# Patient Record
Sex: FEMALE | Race: WHITE | NOT HISPANIC OR LATINO | Employment: UNEMPLOYED | URBAN - METROPOLITAN AREA
[De-identification: names, ages, dates, MRNs, and addresses within clinical notes are randomized per-mention and may not be internally consistent; named-entity substitution may affect disease eponyms.]

---
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
## 2018-01-24 ENCOUNTER — OFFICE VISIT (OUTPATIENT)
Dept: INTERNAL MEDICINE | Facility: CLINIC | Age: 18
End: 2018-01-24
Payer: COMMERCIAL

## 2018-01-24 ENCOUNTER — HOSPITAL ENCOUNTER (EMERGENCY)
Facility: HOSPITAL | Age: 18
Discharge: HOME | End: 2018-01-24
Attending: EMERGENCY MEDICINE
Payer: COMMERCIAL

## 2018-01-24 VITALS
HEART RATE: 110 BPM | HEIGHT: 62 IN | TEMPERATURE: 98 F | OXYGEN SATURATION: 99 % | WEIGHT: 100.31 LBS | DIASTOLIC BLOOD PRESSURE: 66 MMHG | SYSTOLIC BLOOD PRESSURE: 98 MMHG | BODY MASS INDEX: 18.46 KG/M2

## 2018-01-24 VITALS
HEART RATE: 93 BPM | WEIGHT: 100 LBS | BODY MASS INDEX: 18.4 KG/M2 | SYSTOLIC BLOOD PRESSURE: 112 MMHG | HEIGHT: 62 IN | DIASTOLIC BLOOD PRESSURE: 58 MMHG | OXYGEN SATURATION: 100 % | RESPIRATION RATE: 18 BRPM | TEMPERATURE: 97 F

## 2018-01-24 DIAGNOSIS — R11.2 NAUSEA AND VOMITING, INTRACTABILITY OF VOMITING NOT SPECIFIED, UNSPECIFIED VOMITING TYPE: Primary | ICD-10-CM

## 2018-01-24 DIAGNOSIS — R11.2 NAUSEA AND VOMITING, INTRACTABILITY OF VOMITING NOT SPECIFIED, UNSPECIFIED VOMITING TYPE: ICD-10-CM

## 2018-01-24 DIAGNOSIS — R10.9 ABDOMINAL DISCOMFORT: ICD-10-CM

## 2018-01-24 DIAGNOSIS — K52.9 GASTROENTERITIS: Primary | ICD-10-CM

## 2018-01-24 LAB
ALBUMIN SERPL BCP-MCNC: 4.2 G/DL
ALP SERPL-CCNC: 51 U/L
ALT SERPL W/O P-5'-P-CCNC: 24 U/L
ANION GAP SERPL CALC-SCNC: 13 MMOL/L
AST SERPL-CCNC: 24 U/L
B-HCG UR QL: NEGATIVE
BACTERIA #/AREA URNS HPF: ABNORMAL /HPF
BASOPHILS # BLD AUTO: 0.02 K/UL
BASOPHILS NFR BLD: 0.3 %
BILIRUB SERPL-MCNC: 0.5 MG/DL
BILIRUB UR QL STRIP: ABNORMAL
BUN SERPL-MCNC: 17 MG/DL
CALCIUM SERPL-MCNC: 9.2 MG/DL
CHLORIDE SERPL-SCNC: 107 MMOL/L
CLARITY UR: CLEAR
CO2 SERPL-SCNC: 18 MMOL/L
COLOR UR: YELLOW
CREAT SERPL-MCNC: 0.7 MG/DL
DIFFERENTIAL METHOD: ABNORMAL
EOSINOPHIL # BLD AUTO: 0 K/UL
EOSINOPHIL NFR BLD: 0 %
ERYTHROCYTE [DISTWIDTH] IN BLOOD BY AUTOMATED COUNT: 12.6 %
EST. GFR  (AFRICAN AMERICAN): >60 ML/MIN/1.73 M^2
EST. GFR  (NON AFRICAN AMERICAN): >60 ML/MIN/1.73 M^2
GLUCOSE SERPL-MCNC: 71 MG/DL
GLUCOSE UR QL STRIP: NEGATIVE
HCT VFR BLD AUTO: 37 %
HGB BLD-MCNC: 12.9 G/DL
HGB UR QL STRIP: ABNORMAL
KETONES UR QL STRIP: ABNORMAL
LEUKOCYTE ESTERASE UR QL STRIP: NEGATIVE
LIPASE SERPL-CCNC: 10 U/L
LYMPHOCYTES # BLD AUTO: 1.5 K/UL
LYMPHOCYTES NFR BLD: 18.7 %
MCH RBC QN AUTO: 31.5 PG
MCHC RBC AUTO-ENTMCNC: 34.9 G/DL
MCV RBC AUTO: 90 FL
MICROSCOPIC COMMENT: ABNORMAL
MONOCYTES # BLD AUTO: 0.3 K/UL
MONOCYTES NFR BLD: 3.9 %
NEUTROPHILS # BLD AUTO: 6.1 K/UL
NEUTROPHILS NFR BLD: 77.1 %
NITRITE UR QL STRIP: NEGATIVE
PH UR STRIP: 6 [PH] (ref 5–8)
PLATELET # BLD AUTO: 236 K/UL
PMV BLD AUTO: 9.8 FL
POTASSIUM SERPL-SCNC: 3.8 MMOL/L
PROT SERPL-MCNC: 7.2 G/DL
PROT UR QL STRIP: ABNORMAL
RBC # BLD AUTO: 4.1 M/UL
RBC #/AREA URNS HPF: 1 /HPF (ref 0–4)
SODIUM SERPL-SCNC: 138 MMOL/L
SP GR UR STRIP: >=1.03 (ref 1–1.03)
SQUAMOUS #/AREA URNS HPF: 20 /HPF
URN SPEC COLLECT METH UR: ABNORMAL
UROBILINOGEN UR STRIP-ACNC: NEGATIVE EU/DL
WBC # BLD AUTO: 7.9 K/UL
WBC #/AREA URNS HPF: 7 /HPF (ref 0–5)
YEAST URNS QL MICRO: ABNORMAL

## 2018-01-24 PROCEDURE — 81025 URINE PREGNANCY TEST: CPT

## 2018-01-24 PROCEDURE — 80053 COMPREHEN METABOLIC PANEL: CPT

## 2018-01-24 PROCEDURE — 99999 PR PBB SHADOW E&M-EST. PATIENT-LVL III: CPT | Mod: PBBFAC,,, | Performed by: NURSE PRACTITIONER

## 2018-01-24 PROCEDURE — 85025 COMPLETE CBC W/AUTO DIFF WBC: CPT

## 2018-01-24 PROCEDURE — 99214 OFFICE O/P EST MOD 30 MIN: CPT | Mod: 25,S$GLB,, | Performed by: NURSE PRACTITIONER

## 2018-01-24 PROCEDURE — 96372 THER/PROPH/DIAG INJ SC/IM: CPT | Mod: S$GLB,,, | Performed by: FAMILY MEDICINE

## 2018-01-24 PROCEDURE — 81000 URINALYSIS NONAUTO W/SCOPE: CPT

## 2018-01-24 PROCEDURE — 83690 ASSAY OF LIPASE: CPT

## 2018-01-24 PROCEDURE — 25500020 PHARM REV CODE 255: Performed by: NURSE PRACTITIONER

## 2018-01-24 PROCEDURE — 99284 EMERGENCY DEPT VISIT MOD MDM: CPT | Mod: 25

## 2018-01-24 PROCEDURE — 63600175 PHARM REV CODE 636 W HCPCS: Performed by: NURSE PRACTITIONER

## 2018-01-24 PROCEDURE — 96361 HYDRATE IV INFUSION ADD-ON: CPT

## 2018-01-24 PROCEDURE — 25000003 PHARM REV CODE 250: Performed by: NURSE PRACTITIONER

## 2018-01-24 PROCEDURE — 96374 THER/PROPH/DIAG INJ IV PUSH: CPT

## 2018-01-24 RX ORDER — ONDANSETRON 4 MG/1
4 TABLET, FILM COATED ORAL EVERY 6 HOURS
Qty: 12 TABLET | Refills: 0 | Status: SHIPPED | OUTPATIENT
Start: 2018-01-24 | End: 2018-04-02

## 2018-01-24 RX ORDER — ONDANSETRON 2 MG/ML
4 INJECTION INTRAMUSCULAR; INTRAVENOUS
Status: COMPLETED | OUTPATIENT
Start: 2018-01-24 | End: 2018-01-24

## 2018-01-24 RX ORDER — PROMETHAZINE HYDROCHLORIDE 12.5 MG/1
12.5 TABLET ORAL
Qty: 10 TABLET | Refills: 0 | Status: SHIPPED | OUTPATIENT
Start: 2018-01-24 | End: 2018-04-02

## 2018-01-24 RX ORDER — PROMETHAZINE HYDROCHLORIDE 25 MG/ML
12.5 INJECTION, SOLUTION INTRAMUSCULAR; INTRAVENOUS
Status: COMPLETED | OUTPATIENT
Start: 2018-01-24 | End: 2018-01-24

## 2018-01-24 RX ADMIN — IOHEXOL 75 ML: 350 INJECTION, SOLUTION INTRAVENOUS at 08:01

## 2018-01-24 RX ADMIN — SODIUM CHLORIDE 1000 ML: 0.9 INJECTION, SOLUTION INTRAVENOUS at 07:01

## 2018-01-24 RX ADMIN — ONDANSETRON 4 MG: 2 INJECTION INTRAMUSCULAR; INTRAVENOUS at 07:01

## 2018-01-24 RX ADMIN — PROMETHAZINE HYDROCHLORIDE 12.5 MG: 25 INJECTION, SOLUTION INTRAMUSCULAR; INTRAVENOUS at 04:01

## 2018-01-24 NOTE — PROGRESS NOTES
After obtaining consent, and per orders of VJ Veliz NP , injection of SEE MAR  given by Nicolasa Simeon. Patient instructed to remain in clinic for 20 minutes afterwards, and to report any adverse reaction to me immediately.

## 2018-01-24 NOTE — PROGRESS NOTES
"CC:Nausea/Vomiting  HPI:This is a new problem.   The current episode started in the past 3 days.   The problem has been gradually worsening.   Associated symptoms included fatigue, vomiting, diarrhea.   Pertinent negatives include ability to keep down some fluids and suspicious food/drink: none.  Treatments tried: Pepto-Bismol.     Review of patient's allergies indicates:  No Known Allergies    Outpatient Medications Prior to Visit   Medication Sig Dispense Refill    norelgestromin-ethinyl estradiol (ORTHO EVRA) 150-35 mcg/24 hr Place 1 patch onto the skin once a week. Place for 3 weeks each month.  Leave patch off for one week each month to allow period. 4 patch 11    ondansetron (ZOFRAN-ODT) 8 MG TbDL Take 1 tablet (8 mg total) by mouth every 6 (six) hours as needed. 20 tablet 0     No facility-administered medications prior to visit.         Physical Exam   BP 98/66 (BP Location: Right arm, Patient Position: Sitting, BP Method: Medium (Manual))   Pulse 110   Temp 98.3 °F (36.8 °C) (Tympanic)   Ht 5' 2" (1.575 m)   Wt 45.5 kg (100 lb 5 oz)   LMP 01/20/2018   SpO2 99%   BMI 18.35 kg/m²   Constitutional: The patient appears well-developed and well-nourished.   Head: Normocephalic and atraumatic.   .   Cardiovascular: Tachy rate, regular rhythm, S1 normal, S2 normal and normal heart sounds.  Exam reveals no gallop, no S3, no S4 and no friction rub.    No murmur heard.  Pulmonary/Chest: Effort normal and breath sounds are normal. No stridor. Not tachypneic. No respiratory distress. The patient has no wheezes, ronchi, or rales.   Abdomen: The patient has hyperactive bowel sounds and has mild diffuse tenderness without any focal pain.  There is no rebound or guarding noted.  No distention is noted.  Skin: The patient is not diaphoretic.     Encounter Diagnosis   Name Primary?    Nausea and vomiting, intractability of vomiting not specified, unspecified vomiting type Yes       PLAN:    Treasure was seen today " for emesis and chills.    Diagnoses and all orders for this visit:    Nausea and vomiting, intractability of vomiting not specified, unspecified vomiting type  -     promethazine (PHENERGAN) 12.5 MG Tab; Take 1 tablet (12.5 mg total) by mouth every 6 to 8 hours as needed (nausea).  -     promethazine injection 12.5 mg; Inject 0.5 mLs (12.5 mg total) into the muscle one time.        Medications Ordered This Encounter      promethazine (PHENERGAN) 12.5 MG Tab          Sig: Take 1 tablet (12.5 mg total) by mouth every 6 to 8 hours as needed (nausea).          Dispense:  10 tablet          Refill:  0      promethazine injection 12.5 mg  No orders of the defined types were placed in this encounter.    IV started with 18 gz to right hand.  Tolerated well.  500 cc of Normal saline given and injection of Promethazine 12.5 mg sq.  No improvement in symptoms.  Recommended to follow up in ER.      Called and spoke with NIKUNJ Osborne nurse

## 2018-01-25 NOTE — ED PROVIDER NOTES
SCRIBE #1 NOTE: I, Rosario Portillo, am scribing for, and in the presence of, Jermaine Tom NP. I have scribed the entire note.      History      Chief Complaint   Patient presents with    Abdominal Pain     with N/V since Monday, was sent from Urgent Care       Review of patient's allergies indicates:  No Known Allergies     HPI   HPI    1/24/2018, 6:16 PM   History obtained from the patient      History of Present Illness: Treasure Hernandez is a 18 y.o. female patient who presents to the Emergency Department for abd pain which onset gradually two days ago. Pt was seen in [facility] clinic today and was given Phenergan which slightly alleviated her sxs. Pt was sent over from urgent care clinic. Symptoms are constant and moderate in severity. No mitigating or exacerbating factors reported. Associated sxs include nausea and vomiting. Patient denies any fever, chills, hematochezia, constipation, diarrhea, dysuria, hematuria, urinary frequency/urgency, and all other sxs at this time. No further complaints or concerns at this time.         Arrival mode: Personal vehicle    PCP: Jamal Gunn MD       Past Medical History:  Past medical hx reviewed not pertinent.     Past Surgical History:  Past surgical hx reviewed not pertinent.     Family History:  Family hx reviewed not pertinent.     Social History:  Social History     Social History Main Topics    Smoking status: Never Smoker    Smokeless tobacco: Never Used    Alcohol use No    Drug use: No    Sexual activity: Not Currently       ROS   Review of Systems   Constitutional: Negative for chills and fever.   HENT: Negative for sore throat.    Respiratory: Negative for shortness of breath.    Cardiovascular: Negative for chest pain.   Gastrointestinal: Positive for abdominal pain, nausea and vomiting. Negative for blood in stool, constipation and diarrhea.   Genitourinary: Negative for dysuria, frequency, hematuria and urgency.   Musculoskeletal: Negative  "for back pain.   Skin: Negative for rash.   Neurological: Negative for weakness.   Hematological: Does not bruise/bleed easily.   All other systems reviewed and are negative.    Physical Exam      Initial Vitals [01/24/18 1725]   BP Pulse Resp Temp SpO2   (!) 102/56 100 18 97 °F (36.1 °C) 100 %      MAP       71.33          Physical Exam  Nursing Notes and Vital Signs Reviewed.  Constitutional: Patient is in no acute distress. Well-developed and well-nourished.  Head: Atraumatic. Normocephalic.  Eyes: PERRL. EOM intact. Conjunctivae are not pale. No scleral icterus.  ENT: Mucous membranes are moist. Oropharynx is clear and symmetric.    Neck: Supple. Full ROM. No lymphadenopathy.  Cardiovascular: Regular rate. Regular rhythm. No murmurs, rubs, or gallops. Distal pulses are 2+ and symmetric.  Pulmonary/Chest: No respiratory distress. Clear to auscultation bilaterally. No wheezing or rales.  Abdominal: Soft and non-distended.  There is no tenderness.  No rebound, guarding, or rigidity. Good bowel sounds.  Genitourinary: No CVA tenderness  Musculoskeletal: Moves all extremities. No obvious deformities. No edema. No calf tenderness.  Skin: Warm and dry.  Neurological:  Alert, awake, and appropriate.  Normal speech.  No acute focal neurological deficits are appreciated.  Psychiatric: Normal affect. Good eye contact. Appropriate in content.    ED Course    Procedures  ED Vital Signs:  Vitals:    01/24/18 1725 01/24/18 2104   BP: (!) 102/56 (!) 112/58   Pulse: 100 93   Resp: 18 18   Temp: 97 °F (36.1 °C) 97 °F (36.1 °C)   TempSrc: Oral Oral   SpO2: 100% 100%   Weight: 45.4 kg (100 lb)    Height: 5' 2" (1.575 m)        Abnormal Lab Results:  Labs Reviewed   CBC W/ AUTO DIFFERENTIAL - Abnormal; Notable for the following:        Result Value    MCH 31.5 (*)     Gran% 77.1 (*)     Mono% 3.9 (*)     All other components within normal limits   COMPREHENSIVE METABOLIC PANEL - Abnormal; Notable for the following:     CO2 18 (*)  "    Alkaline Phosphatase 51 (*)     All other components within normal limits   URINALYSIS - Abnormal; Notable for the following:     Specific Gravity, UA >=1.030 (*)     Protein, UA Trace (*)     Ketones, UA 3+ (*)     Bilirubin (UA) 1+ (*)     Occult Blood UA 1+ (*)     All other components within normal limits   URINALYSIS MICROSCOPIC - Abnormal; Notable for the following:     WBC, UA 7 (*)     Bacteria, UA Moderate (*)     All other components within normal limits   LIPASE   PREGNANCY TEST, URINE RAPID        All Lab Results:  Results for orders placed or performed during the hospital encounter of 01/24/18   CBC W/ AUTO DIFFERENTIAL   Result Value Ref Range    WBC 7.90 3.90 - 12.70 K/uL    RBC 4.10 4.00 - 5.40 M/uL    Hemoglobin 12.9 12.0 - 16.0 g/dL    Hematocrit 37.0 37.0 - 48.5 %    MCV 90 82 - 98 fL    MCH 31.5 (H) 27.0 - 31.0 pg    MCHC 34.9 32.0 - 36.0 g/dL    RDW 12.6 11.5 - 14.5 %    Platelets 236 150 - 350 K/uL    MPV 9.8 9.2 - 12.9 fL    Gran # (ANC) 6.1 1.8 - 7.7 K/uL    Lymph # 1.5 1.0 - 4.8 K/uL    Mono # 0.3 0.3 - 1.0 K/uL    Eos # 0.0 0.0 - 0.5 K/uL    Baso # 0.02 0.00 - 0.20 K/uL    Gran% 77.1 (H) 38.0 - 73.0 %    Lymph% 18.7 18.0 - 48.0 %    Mono% 3.9 (L) 4.0 - 15.0 %    Eosinophil% 0.0 0.0 - 8.0 %    Basophil% 0.3 0.0 - 1.9 %    Differential Method Automated    Comp. Metabolic Panel   Result Value Ref Range    Sodium 138 136 - 145 mmol/L    Potassium 3.8 3.5 - 5.1 mmol/L    Chloride 107 95 - 110 mmol/L    CO2 18 (L) 23 - 29 mmol/L    Glucose 71 70 - 110 mg/dL    BUN, Bld 17 6 - 20 mg/dL    Creatinine 0.7 0.5 - 1.4 mg/dL    Calcium 9.2 8.7 - 10.5 mg/dL    Total Protein 7.2 6.0 - 8.4 g/dL    Albumin 4.2 3.5 - 5.2 g/dL    Total Bilirubin 0.5 0.1 - 1.0 mg/dL    Alkaline Phosphatase 51 (L) 55 - 135 U/L    AST 24 10 - 40 U/L    ALT 24 10 - 44 U/L    Anion Gap 13 8 - 16 mmol/L    eGFR if African American >60 >60 mL/min/1.73 m^2    eGFR if non African American >60 >60 mL/min/1.73 m^2   Lipase   Result  Value Ref Range    Lipase 10 4 - 60 U/L   Urinalysis - Clean Catch   Result Value Ref Range    Specimen UA Urine, Clean Catch     Color, UA Yellow Yellow, Straw, Nilsa    Appearance, UA Clear Clear    pH, UA 6.0 5.0 - 8.0    Specific Gravity, UA >=1.030 (A) 1.005 - 1.030    Protein, UA Trace (A) Negative    Glucose, UA Negative Negative    Ketones, UA 3+ (A) Negative    Bilirubin (UA) 1+ (A) Negative    Occult Blood UA 1+ (A) Negative    Nitrite, UA Negative Negative    Urobilinogen, UA Negative <2.0 EU/dL    Leukocytes, UA Negative Negative   Pregnancy, urine rapid   Result Value Ref Range    Preg Test, Ur Negative    Urinalysis Microscopic   Result Value Ref Range    RBC, UA 1 0 - 4 /hpf    WBC, UA 7 (H) 0 - 5 /hpf    Bacteria, UA Moderate (A) None-Occ /hpf    Yeast, UA None None    Squam Epithel, UA 20 /hpf    Microscopic Comment SEE COMMENT          Imaging Results:  Imaging Results          CT Abdomen Pelvis With Contrast (Final result)  Result time 01/24/18 20:17:34    Final result by Varinder Crowley MD (01/24/18 20:17:34)                 Impression:             No abnormality identified in the abdomen or pelvis.        All CT scans at this facility use dose modulation, iterative reconstruction and/or weight based dosing when appropriate to reduce radiation dose to as low as reasonably achievable.        Electronically signed by: VARINDER CROWLEY MD  Date:     01/24/18  Time:    20:17              Narrative:    Exam: CT scan of the abdomen and pelvis with contrast    Technique: Axial CT imaging was performed through the abdomen and pelvis with  75cc  of intravenous contrast. Multiplanar reformats were performed and interpreted.    Clinical History:    Abdominal pain     Comparison:  None     Findings:          Lung bases are clear.     The liver, spleen, kidneys, adrenal glands, and pancreas are normal.    The gallbladder is unremarkable.   No free fluid, free air, or inflammatory change.      The bowel is  nondistended and within normal limits.  Normal appendix.    The abdominal aorta is normal in caliber.     The urinary bladder is unremarkable.       No significant osseous abnormality is identified.                                      The Emergency Provider reviewed the vital signs and test results, which are outlined above.    ED Discussion     8:50 PM: Reassessed pt at this time.  Pt states her condition has improved at this time. Discussed with pt all pertinent ED information and results. Discussed pt dx and plan of tx. Gave pt all f/u and return to the ED instructions. All questions and concerns were addressed at this time. Pt expresses understanding of information and instructions, and is comfortable with plan to discharge. Pt is stable for discharge.    I discussed with patient and/or family/caretaker that evaluation in the ED does not suggest any emergent or life threatening medical conditions requiring immediate intervention beyond what was provided in the ED, and I believe patient is safe for discharge.  Regardless, an unremarkable evaluation in the ED does not preclude the development or presence of a serious of life threatening condition. As such, patient was instructed to return immediately for any worsening or change in current symptoms.      ED Medication(s):  Medications   ondansetron injection 4 mg (4 mg Intravenous Given 1/24/18 1908)   sodium chloride 0.9% bolus 1,000 mL (0 mLs Intravenous Stopped 1/24/18 2105)   omnipaque 350 iohexol 75 mL (75 mLs Intravenous Given 1/24/18 2008)       Discharge Medication List as of 1/24/2018  8:50 PM      START taking these medications    Details   ondansetron (ZOFRAN) 4 MG tablet Take 1 tablet (4 mg total) by mouth every 6 (six) hours., Starting Wed 1/24/2018, Print             Follow-up Information     Jamal Gunn MD. Schedule an appointment as soon as possible for a visit in 2 days.    Specialty:  Family Medicine  Contact information:  [address]  [facility] [address]  [phone]             [facility] - .    Specialty:  Emergency Medicine  Why:  As needed, If symptoms worsen  Contact information:  [address]  [facility] [address] [phone]                   Medical Decision Making    Medical Decision Making:   Clinical Tests:   Lab Tests: Ordered and Reviewed  Radiological Study: Ordered and Reviewed           Scribe Attestation:   Scribe #1: I performed the above scribed service and the documentation accurately describes the services I performed. I attest to the accuracy of the note.    Attending:   Physician Attestation Statement for Scribe #1: I, Jermaine Tom NP, personally performed the services described in this documentation, as scribed by Rosario Portillo, in my presence, and it is both accurate and complete.          Clinical Impression       ICD-10-CM ICD-9-CM   1. Gastroenteritis K52.9 558.9   2. Nausea and vomiting, intractability of vomiting not specified, unspecified vomiting type R11.2 787.01   3. Abdominal discomfort R10.9 789.00       Disposition:   Disposition: Discharged  Condition: Stable         Jermaine Tom NP  01/25/18 6467

## 2018-01-25 NOTE — ED NOTES
"Orthostatic BP:    Lyin/59; 90-HR    Sittin/57; 93- HR     Standin/69; 118-HR    Patient reports feeling "queasy" when changed from lying to sitting position.    "

## 2018-02-23 ENCOUNTER — OFFICE VISIT (OUTPATIENT)
Dept: GASTROENTEROLOGY | Facility: CLINIC | Age: 18
End: 2018-02-23
Payer: COMMERCIAL

## 2018-02-23 ENCOUNTER — HOSPITAL ENCOUNTER (OUTPATIENT)
Dept: RADIOLOGY | Facility: HOSPITAL | Age: 18
Discharge: HOME | End: 2018-02-23
Attending: NURSE PRACTITIONER
Payer: COMMERCIAL

## 2018-02-23 VITALS
DIASTOLIC BLOOD PRESSURE: 78 MMHG | HEIGHT: 62 IN | SYSTOLIC BLOOD PRESSURE: 104 MMHG | BODY MASS INDEX: 17.29 KG/M2 | HEART RATE: 114 BPM | WEIGHT: 93.94 LBS

## 2018-02-23 DIAGNOSIS — R10.30 LOWER ABDOMINAL PAIN: Primary | ICD-10-CM

## 2018-02-23 DIAGNOSIS — R11.0 NAUSEA: ICD-10-CM

## 2018-02-23 DIAGNOSIS — R63.0 DECREASED APPETITE: ICD-10-CM

## 2018-02-23 DIAGNOSIS — R68.81 EARLY SATIETY: ICD-10-CM

## 2018-02-23 DIAGNOSIS — K59.00 CONSTIPATION, UNSPECIFIED CONSTIPATION TYPE: ICD-10-CM

## 2018-02-23 DIAGNOSIS — R14.0 BLOATING: ICD-10-CM

## 2018-02-23 DIAGNOSIS — R10.30 LOWER ABDOMINAL PAIN: ICD-10-CM

## 2018-02-23 PROCEDURE — 74019 RADEX ABDOMEN 2 VIEWS: CPT | Mod: 26,,, | Performed by: RADIOLOGY

## 2018-02-23 PROCEDURE — 3008F BODY MASS INDEX DOCD: CPT | Mod: S$GLB,,, | Performed by: NURSE PRACTITIONER

## 2018-02-23 PROCEDURE — 99204 OFFICE O/P NEW MOD 45 MIN: CPT | Mod: S$GLB,,, | Performed by: NURSE PRACTITIONER

## 2018-02-23 PROCEDURE — 99999 PR PBB SHADOW E&M-EST. PATIENT-LVL III: CPT | Mod: PBBFAC,,, | Performed by: NURSE PRACTITIONER

## 2018-02-23 PROCEDURE — 74019 RADEX ABDOMEN 2 VIEWS: CPT | Mod: TC,FY,PO

## 2018-03-25 NOTE — PROGRESS NOTES
"Outpatient Psychiatry Initial Visit (MD/NP)    3/26/2018    Treasure Hernandez, a 18 y.o. female, presenting for initial evaluation visit. Met with patient.    Reason for Encounter: Patient complains of anxiety.     History of Present Illness: Patient is a 17 y/o F with hx of anxiety, presents for evaluation, reports anxiety - "most of my life". Worse since she's gone away to college, worries about academic performance ("everyone thought I was smart in high school". "takes me longer than others to learn what I need to learn", "everyone was better than me at everything". "I didn't know what I was doing"), though on exploration, she recognizes that these are exaggerated ideas. Also anxiety about social interactions. Feels anxious particularly at school, mostly ok when at home. Describes - Unable to control worry, trouble relaxing, restless - daily; overworries, nervous most days. Irritable, apprehensive some days; ERIC-7 = 15. Also describes problems falling and staying asleep, sad > 1/2 time. Decreased appetite and small weight loss. Concentration problems, guilt, decreased interest, anergia, slowing, restlessness. qids = 16.      PsychHx: Psychotherapy at [facility] student mental health in past.   Past suicidal fantasies. No suicidal behaviors. Wouldn't want to cause problems for people around her.   No AVH. No delusions. No nate.   No other problems.   Sometimes depressed.     Family hx: older brother and gm have bipolar disorder. GM was admitted to psych hospital before finally acknowledged problem. She suspects younger brother has anxiety disorder like herself.     SocialHx: grew up with both parents. Always been shy, "scared of everything". "instead of encouraging". anxiety bad - ended up staying in my room. Withdrew from school - 1.5 to 2 years ago.   Babysitting, doing things for parents. Attempts to get a job every now and then. Spends time with friends. Lives with parents.   [place] Lived at [facility] for 1 " "semester. Stopped going to classes and had to withdraw. "could have stayed, but knew   Parents haven't pushed her to fix the problem. "they'd rather push me out". "They're in denial". 3.25 GPA in HS "because I sometimes didn't do my homework because I had   32 on ACT (tied for 2nd highest in her school).   Aspiration to open a restaurant.   2 brothers.   Chronically nervous.   Has a boyfriend. Met around time of [employer]. Good relationship. He's supportive and encouraging. He goes to college in [place].   Could function in high school.   Not sure she'd be motivated to do online program. Doesn't follow-through with attempts to   May try to babysit.     MedHx: lactose intolerance, whey allergy. "lots of stomach problems".     Review Of Systems:     GENERAL:  No weight gain or loss  SKIN:  No rashes or lacerations  HEAD:  No headaches  EYES:  No exophthalmos, jaundice or blindness  EARS:  No dizziness, tinnitus or hearing loss  NOSE:  No changes in smell  MOUTH & THROAT:  No dyskinetic movements or obvious goiter  CHEST:  No shortness of breath, hyperventilation or cough  CARDIOVASCULAR:  No tachycardia or chest pain  ABDOMEN:  No nausea, vomiting, pain, constipation or diarrhea  URINARY:  No frequency, dysuria or sexual dysfunction  ENDOCRINE:  No polydipsia, polyuria  MUSCULOSKELETAL:  No pain or stiffness of the joints  NEUROLOGIC:  No weakness, sensory changes, seizures, confusion, memory loss, tremor or other abnormal movements    Current Evaluation:     Nutritional Screening: Considering the patient's height and weight, medications, medical history and preferences, should a referral be made to the dietitian? no    Constitutional  Vitals:  Most recent vital signs, dated less than 90 days prior to this appointment, were not reviewed.    There were no vitals filed for this visit.     General:  unremarkable, age appropriate     Musculoskeletal  Muscle Strength/Tone:  no tremor, no tic   Gait & Station:  non-ataxic "     Psychiatric  Appearance: casually dressed & groomed;   Behavior: calm,   Cooperation: cooperative with assessment  Speech: normal rate, volume, tone  Thought Process: linear, goal-directed  Thought Content: No suicidal or homicidal ideation; no delusions  Affect: normal range  Mood: euthymic  Perceptions: No auditory or visual hallucinations  Level of Consciousness: alert throughout interview  Insight: fair  Cognition: Oriented to person, place, time, & situation  Memory: no apparent deficits to general clinical interview; not formally assessed  Attention/Concentration: no apparent deficits to general clinical interview; not formally assessed  Fund of Knowledge: average by vocabulary/education    Laboratory Data  No visits with results within 1 Month(s) from this visit.   Latest known visit with results is:   Admission on 01/24/2018, Discharged on 01/24/2018   Component Date Value Ref Range Status    WBC 01/24/2018 7.90  3.90 - 12.70 K/uL Final    RBC 01/24/2018 4.10  4.00 - 5.40 M/uL Final    Hemoglobin 01/24/2018 12.9  12.0 - 16.0 g/dL Final    Hematocrit 01/24/2018 37.0  37.0 - 48.5 % Final    MCV 01/24/2018 90  82 - 98 fL Final    MCH 01/24/2018 31.5* 27.0 - 31.0 pg Final    MCHC 01/24/2018 34.9  32.0 - 36.0 g/dL Final    RDW 01/24/2018 12.6  11.5 - 14.5 % Final    Platelets 01/24/2018 236  150 - 350 K/uL Final    MPV 01/24/2018 9.8  9.2 - 12.9 fL Final    Gran # (ANC) 01/24/2018 6.1  1.8 - 7.7 K/uL Final    Lymph # 01/24/2018 1.5  1.0 - 4.8 K/uL Final    Mono # 01/24/2018 0.3  0.3 - 1.0 K/uL Final    Eos # 01/24/2018 0.0  0.0 - 0.5 K/uL Final    Baso # 01/24/2018 0.02  0.00 - 0.20 K/uL Final    Gran% 01/24/2018 77.1* 38.0 - 73.0 % Final    Lymph% 01/24/2018 18.7  18.0 - 48.0 % Final    Mono% 01/24/2018 3.9* 4.0 - 15.0 % Final    Eosinophil% 01/24/2018 0.0  0.0 - 8.0 % Final    Basophil% 01/24/2018 0.3  0.0 - 1.9 % Final    Differential Method 01/24/2018 Automated   Final    Sodium  01/24/2018 138  136 - 145 mmol/L Final    Potassium 01/24/2018 3.8  3.5 - 5.1 mmol/L Final    Chloride 01/24/2018 107  95 - 110 mmol/L Final    CO2 01/24/2018 18* 23 - 29 mmol/L Final    Glucose 01/24/2018 71  70 - 110 mg/dL Final    BUN, Bld 01/24/2018 17  6 - 20 mg/dL Final    Creatinine 01/24/2018 0.7  0.5 - 1.4 mg/dL Final    Calcium 01/24/2018 9.2  8.7 - 10.5 mg/dL Final    Total Protein 01/24/2018 7.2  6.0 - 8.4 g/dL Final    Albumin 01/24/2018 4.2  3.5 - 5.2 g/dL Final    Total Bilirubin 01/24/2018 0.5  0.1 - 1.0 mg/dL Final    Alkaline Phosphatase 01/24/2018 51* 55 - 135 U/L Final    AST 01/24/2018 24  10 - 40 U/L Final    ALT 01/24/2018 24  10 - 44 U/L Final    Anion Gap 01/24/2018 13  8 - 16 mmol/L Final    eGFR if  01/24/2018 >60  >60 mL/min/1.73 m^2 Final    eGFR if non African American 01/24/2018 >60  >60 mL/min/1.73 m^2 Final    Lipase 01/24/2018 10  4 - 60 U/L Final    Specimen UA 01/24/2018 Urine, Clean Catch   Final    Color, UA 01/24/2018 Yellow  Yellow, Straw, Nilsa Final    Appearance, UA 01/24/2018 Clear  Clear Final    pH, UA 01/24/2018 6.0  5.0 - 8.0 Final    Specific Gravity, UA 01/24/2018 >=1.030* 1.005 - 1.030 Final    Protein, UA 01/24/2018 Trace* Negative Final    Glucose, UA 01/24/2018 Negative  Negative Final    Ketones, UA 01/24/2018 3+* Negative Final    Bilirubin (UA) 01/24/2018 1+* Negative Final    Occult Blood UA 01/24/2018 1+* Negative Final    Nitrite, UA 01/24/2018 Negative  Negative Final    Urobilinogen, UA 01/24/2018 Negative  <2.0 EU/dL Final    Leukocytes, UA 01/24/2018 Negative  Negative Final    Preg Test, Ur 01/24/2018 Negative   Final    RBC, UA 01/24/2018 1  0 - 4 /hpf Final    WBC, UA 01/24/2018 7* 0 - 5 /hpf Final    Bacteria, UA 01/24/2018 Moderate* None-Occ /hpf Final    Yeast, UA 01/24/2018 None  None Final    Squam Epithel, UA 01/24/2018 20  /hpf Final    Microscopic Comment 01/24/2018 SEE COMMENT   Final          Medications  Outpatient Encounter Prescriptions as of 3/26/2018   Medication Sig Dispense Refill    norelgestromin-ethinyl estradiol (ORTHO EVRA) 150-35 mcg/24 hr Place 1 patch onto the skin once a week. Place for 3 weeks each month.  Leave patch off for one week each month to allow period. 4 patch 11    ondansetron (ZOFRAN) 4 MG tablet Take 1 tablet (4 mg total) by mouth every 6 (six) hours. 12 tablet 0    promethazine (PHENERGAN) 12.5 MG Tab Take 1 tablet (12.5 mg total) by mouth every 6 to 8 hours as needed (nausea). 10 tablet 0     No facility-administered encounter medications on file as of 3/26/2018.      Assessment - Diagnosis - Goals:     Impression: 17 y/o F with generalized anxiety disorder, ongoing distress and impairment. Would benefit from trial of SSRI and psychotherapy.     Dx: generalized anxiety disorder    Treatment Goals:  Specify outcomes written in observable, behavioral terms: decrease subjective anxiety reports, functioning    Treatment Plan/Recommendations:   · Trial of escitalopram. Discussed risks, benefits, and alternatives to treatment plan documented above with patient. I answered all patient questions related to this plan and patient expressed understanding and agreement.   · Psychotherapy referral.     Return to Clinic: 2 months    Counseling time: 10 minutes  Total time: 50 minutes    ABUNDIO Pierre MD

## 2018-03-26 ENCOUNTER — OFFICE VISIT (OUTPATIENT)
Dept: PSYCHIATRY | Facility: CLINIC | Age: 18
End: 2018-03-26
Payer: COMMERCIAL

## 2018-03-26 DIAGNOSIS — F41.1 GAD (GENERALIZED ANXIETY DISORDER): Primary | ICD-10-CM

## 2018-03-26 PROCEDURE — 90792 PSYCH DIAG EVAL W/MED SRVCS: CPT | Mod: S$GLB,,, | Performed by: PSYCHIATRY & NEUROLOGY

## 2018-03-26 RX ORDER — ESCITALOPRAM OXALATE 10 MG/1
TABLET ORAL
Qty: 30 TABLET | Refills: 1 | Status: SHIPPED | OUTPATIENT
Start: 2018-03-26 | End: 2018-04-02

## 2018-03-31 ENCOUNTER — PATIENT MESSAGE (OUTPATIENT)
Dept: GASTROENTEROLOGY | Facility: CLINIC | Age: 18
End: 2018-03-31

## 2018-03-31 ENCOUNTER — PATIENT MESSAGE (OUTPATIENT)
Dept: PSYCHIATRY | Facility: CLINIC | Age: 18
End: 2018-03-31

## 2018-04-02 ENCOUNTER — OFFICE VISIT (OUTPATIENT)
Dept: ALLERGY | Facility: CLINIC | Age: 18
End: 2018-04-02
Payer: COMMERCIAL

## 2018-04-02 ENCOUNTER — HOSPITAL ENCOUNTER (OUTPATIENT)
Dept: RADIOLOGY | Facility: HOSPITAL | Age: 18
Discharge: HOME | End: 2018-04-02
Attending: ALLERGY & IMMUNOLOGY
Payer: COMMERCIAL

## 2018-04-02 ENCOUNTER — OFFICE VISIT (OUTPATIENT)
Dept: FAMILY MEDICINE | Facility: CLINIC | Age: 18
End: 2018-04-02
Payer: COMMERCIAL

## 2018-04-02 VITALS
HEART RATE: 99 BPM | BODY MASS INDEX: 17.93 KG/M2 | HEIGHT: 62 IN | WEIGHT: 97.44 LBS | OXYGEN SATURATION: 98 % | TEMPERATURE: 99 F | DIASTOLIC BLOOD PRESSURE: 58 MMHG | SYSTOLIC BLOOD PRESSURE: 102 MMHG

## 2018-04-02 VITALS
HEIGHT: 63 IN | DIASTOLIC BLOOD PRESSURE: 60 MMHG | WEIGHT: 96.56 LBS | RESPIRATION RATE: 15 BRPM | HEART RATE: 78 BPM | BODY MASS INDEX: 17.11 KG/M2 | SYSTOLIC BLOOD PRESSURE: 100 MMHG | TEMPERATURE: 98 F

## 2018-04-02 DIAGNOSIS — R11.2 NON-INTRACTABLE VOMITING WITH NAUSEA, UNSPECIFIED VOMITING TYPE: Primary | ICD-10-CM

## 2018-04-02 DIAGNOSIS — F41.1 GAD (GENERALIZED ANXIETY DISORDER): ICD-10-CM

## 2018-04-02 DIAGNOSIS — R63.4 WEIGHT LOSS: ICD-10-CM

## 2018-04-02 DIAGNOSIS — K90.49 FOOD INTOLERANCE: ICD-10-CM

## 2018-04-02 DIAGNOSIS — R11.2 NON-INTRACTABLE VOMITING WITH NAUSEA, UNSPECIFIED VOMITING TYPE: ICD-10-CM

## 2018-04-02 DIAGNOSIS — R19.7 DIARRHEA, UNSPECIFIED TYPE: ICD-10-CM

## 2018-04-02 LAB
BACTERIA #/AREA URNS AUTO: ABNORMAL /HPF
BILIRUB UR QL STRIP: NEGATIVE
CLARITY UR REFRACT.AUTO: ABNORMAL
COLOR UR AUTO: YELLOW
GLUCOSE UR QL STRIP: NEGATIVE
HGB UR QL STRIP: ABNORMAL
HYALINE CASTS UR QL AUTO: 0 /LPF
KETONES UR QL STRIP: NEGATIVE
LEUKOCYTE ESTERASE UR QL STRIP: NEGATIVE
MICROSCOPIC COMMENT: ABNORMAL
NITRITE UR QL STRIP: NEGATIVE
PH UR STRIP: 8 [PH] (ref 5–8)
PROT UR QL STRIP: ABNORMAL
RBC #/AREA URNS AUTO: 21 /HPF (ref 0–4)
SP GR UR STRIP: 1.03 (ref 1–1.03)
SQUAMOUS #/AREA URNS AUTO: 2 /HPF
URN SPEC COLLECT METH UR: ABNORMAL
UROBILINOGEN UR STRIP-ACNC: NEGATIVE EU/DL
WBC #/AREA URNS AUTO: 0 /HPF (ref 0–5)
YEAST UR QL AUTO: ABNORMAL

## 2018-04-02 PROCEDURE — 71046 X-RAY EXAM CHEST 2 VIEWS: CPT | Mod: 26,,, | Performed by: RADIOLOGY

## 2018-04-02 PROCEDURE — 95004 PERQ TESTS W/ALRGNC XTRCS: CPT | Mod: S$GLB,,, | Performed by: ALLERGY & IMMUNOLOGY

## 2018-04-02 PROCEDURE — 99214 OFFICE O/P EST MOD 30 MIN: CPT | Mod: S$GLB,,, | Performed by: NURSE PRACTITIONER

## 2018-04-02 PROCEDURE — 71046 X-RAY EXAM CHEST 2 VIEWS: CPT | Mod: TC,FY,PO

## 2018-04-02 PROCEDURE — 99999 PR PBB SHADOW E&M-EST. PATIENT-LVL IV: CPT | Mod: PBBFAC,,, | Performed by: ALLERGY & IMMUNOLOGY

## 2018-04-02 PROCEDURE — 99204 OFFICE O/P NEW MOD 45 MIN: CPT | Mod: 25,S$GLB,, | Performed by: ALLERGY & IMMUNOLOGY

## 2018-04-02 PROCEDURE — 87491 CHLMYD TRACH DNA AMP PROBE: CPT

## 2018-04-02 PROCEDURE — 81001 URINALYSIS AUTO W/SCOPE: CPT

## 2018-04-02 PROCEDURE — 99999 PR PBB SHADOW E&M-EST. PATIENT-LVL III: CPT | Mod: PBBFAC,,, | Performed by: NURSE PRACTITIONER

## 2018-04-02 RX ORDER — ONDANSETRON 8 MG/1
8 TABLET, ORALLY DISINTEGRATING ORAL 3 TIMES DAILY PRN
Qty: 20 TABLET | Refills: 0 | Status: SHIPPED | OUTPATIENT
Start: 2018-04-02

## 2018-04-02 RX ORDER — BUSPIRONE HYDROCHLORIDE 5 MG/1
5 TABLET ORAL 2 TIMES DAILY
Qty: 60 TABLET | Refills: 2 | Status: SHIPPED | OUTPATIENT
Start: 2018-04-02 | End: 2018-05-23 | Stop reason: ALTCHOICE

## 2018-04-02 NOTE — PROGRESS NOTES
"Subjective:       Patient ID: Treasure Hernandez is a 18 y.o. female.    Chief Complaint: Fatigue; Nausea; Diarrhea; and Abdominal Pain  Pt reports to clinic with chief complaint of nausea, vomiting and diarrhea.  Reports symptoms have been present for 6months.  Has been evaluated by GI, thought to have constipation and placed on miralax.  Labs are unremarkable.  Pt reports approx 3 BM's per day.  Reports soft brown and malodorous.  Notes stools are worse after eating milk products.  Mild abd cramping which is relieved by lying on left side.  Notes frequent belching and dyspepsia.  Pt reports she has stopped miralax use.  While using miralax she would "instantly have relief".  Pt states she "didn't experience diarrhea while using miralax, but since stopping medication, she is having diarrhea".   Pt reports being placed on lexapro for ERIC which caused GI upset.  She has since discontinued use.   LMP: currently active.  She is sexually active, protected.  Denies illicit drug use.  Reports rare ETOH use.      Hx of ERIC.  Has been evaluated by psych.  Awaiting follow up appt.  Pt denies self induced vomiting.  States "i want to gain weight".  Reports she is worried about "disappointing everyone".  Stopped school because "anxiety was worse".   Fatigue   Associated symptoms include abdominal pain, fatigue and nausea.   Nausea   Associated symptoms include abdominal pain, fatigue and nausea.   Diarrhea    This is a recurrent problem. The current episode started more than 1 month ago. The problem occurs 2 to 4 times per day. The problem has been unchanged. Associated symptoms include abdominal pain. Nothing aggravates the symptoms.   Abdominal Pain   Associated symptoms include diarrhea and nausea.     Review of Systems   Constitutional: Positive for fatigue.   HENT: Negative.    Respiratory: Negative.    Cardiovascular: Negative.    Gastrointestinal: Positive for abdominal pain, diarrhea and nausea.   Genitourinary: " Negative.    Musculoskeletal: Negative.    Psychiatric/Behavioral: The patient is nervous/anxious.        Objective:      Physical Exam   Constitutional: She is oriented to person, place, and time. She appears well-developed and well-nourished.   HENT:   Head: Normocephalic.   Eyes: EOM are normal.   Neck: Neck supple.   Cardiovascular: Normal rate and normal heart sounds.    Pulmonary/Chest: Effort normal and breath sounds normal.   Abdominal: Bowel sounds are normal. There is no tenderness.   flat   Neurological: She is alert and oriented to person, place, and time.   Skin: Skin is warm and dry.   Psychiatric: Her mood appears anxious.   Excessive finger movements; crying   Vitals reviewed.      Assessment:       1. Non-intractable vomiting with nausea, unspecified vomiting type    2. Diarrhea, unspecified type    3. ERIC (generalized anxiety disorder)        Plan:   Non-intractable vomiting with nausea, unspecified vomiting type  -     Comprehensive metabolic panel; Future; Expected date: 04/02/2018  -     hCG, quantitative; Future; Expected date: 04/02/2018  -     URINALYSIS  -     Hepatitis panel, acute; Future; Expected date: 04/02/2018  -     RPR; Future; Expected date: 04/02/2018  -     HIV-1 and HIV-2 antibodies; Future; Expected date: 04/02/2018  -     C. trachomatis/N. gonorrhoeae by AMP DNA Urine  -     ondansetron (ZOFRAN-ODT) 8 MG TbDL; Take 1 tablet (8 mg total) by mouth 3 (three) times daily as needed.  Dispense: 20 tablet; Refill: 0  -     H. PYLORI ANTIBODY, IGG; Future; Expected date: 04/02/2018  -follow up appt with GI  Diarrhea, unspecified type  -     Comprehensive metabolic panel; Future; Expected date: 04/02/2018  -     Hepatitis panel, acute; Future; Expected date: 04/02/2018  -     RPR; Future; Expected date: 04/02/2018  -     HIV-1 and HIV-2 antibodies; Future; Expected date: 04/02/2018  -     C. trachomatis/N. gonorrhoeae by AMP DNA Urine  -     H. PYLORI ANTIBODY, IGG; Future; Expected  date: 04/02/2018    ERIC (generalized anxiety disorder)  -     busPIRone (BUSPAR) 5 MG Tab; Take 1 tablet (5 mg total) by mouth 2 (two) times daily.  Dispense: 60 tablet; Refill: 2  Follow up with psych    No Follow-up on file.

## 2018-04-02 NOTE — PROGRESS NOTES
Chief complaint: Concerned about possible food ALLERGY    This note was dictated using voice recognition software and may contain errors.    History:    She had a 9 AM appointment on Monday, April 2, 2018.  She was accompanied by her mother.  Her mother was present throughout the entire appointment.    Since approximately November 2017 she is experienced nausea, vomiting, diarrhea, constipation, and weight loss.  Perhaps in October or early November she may have been ill.  She had symptoms over the weekend in October or November 20 the onset of her other symptoms.    She stated that she is not engaged in foreign travel.    Her mother stated as an infant she was ALLERGIC to milk.  She developed hives.  This ceased being a problem.  She informed me that she suffers from lactose intolerance.  She is not suspicious that she is had ALLERGIC reactions to other foods but is concerned that food ALLERGY could be contributing to her recent symptoms.    She does not believe that she is ALLERGIC to any animals.    Social history: She has never smoked.  Currently she is not working or attending school.    Family history is significant.  Multiple family members have gallbladder disease.  They're not aware of any family members with cystic fibrosis or celiac disease.  There is a family history of individuals with Hashimoto's thyroid disorder.  There are family members with hayfever and food ALLERGY.    Past medical history: She has no history of asthma, nose or sinus surgery or nasal polyps.  She has no history of heart liver or kidney or thyroid disease.  She stated that she does experience anxiety.    Family history: Her brother has experienced multiple cord dysfunction.    Review of systems: She is not experienced any fever.  She stated during the past 3 months she has lost about 10 or 12 pounds.  She stated sometimes she will have a normal appetite and other times she does not.    She is interested in pursuing evaluation of  possible food ALLERGY    Exam:    In general she is in no distress she is alert oriented well-developed in good mood and intentive  Gait steady  Skin no rash noted  Head no swelling noted  Eyes sclera white conjunctiva pink  Nose patent no polyps seen  Mouth no inflammation or swelling of the lips tongue or in the throat noted  Ears not inflamed tympanic membranes not inflamed  Lungs clear to auscultation  Neck no masses or thyromegaly noted  Lymph nodes no significant cervical or epitrochlear lymphadenopathy noted  Heart no murmurs heard regular rhythm  Extremities no swelling or inflammation of the hands or legs noted    Information in her medical record regarding her past medical history family history and social history was reviewed and updated today.  Significant additions if any are as noted above or below.    I reviewed with them results of imaging studies performed earlier this year and also results of laboratory tests performed earlier this year.    ALLERGY testing:    She elected to have diagnostic immediate hypersensitivity skin tests performed.  59 foods were tested plus histamine and saline.  Prick skin tests were performed on the volar forearms.  These tests were personally evaluated and interpreted by myself 15 minutes after they were performed.  The histamine control was positive and saline control negative.  All foods tested were negative.  I reviewed the results of the tests with them.    Impression:    #1 vomiting, cause uncertain  #2 diarrhea cause uncertain  #3 constipation cause uncertain  #4 food intolerance  #5 weight loss, cause uncertain  #6 anxiety  #7 other health concerns as noted in her medical record  #8 family history of gallbladder disease and thyroid disease    Assessment and plan:    Her appointment was 55 minutes in duration.  The appointment was spent in face-to-face contact.  More than 50% of the visit was spent in counseling and coordination of care.  An additional 15 minutes  were required for the performance and evaluation of her immediate hypersensitivity skin tests.    We had a lengthy discussion regarding her symptoms and concerns.  Her weight loss is a significant concern.  In view of this I recommended that a chest x-ray be performed.  Her mother stated that she may never had a chest x-ray performed in the past.  I recommended that diagnostic laboratory tests be performed to compliment those tests performed earlier this year.  When the results of these diagnostic tests are available to review with her I will do so.  Additional recommendations may be made at that time.    I told them I could not state with certainty the cause of all of her symptoms.  I told them, in my opinion, it is important to acknowledge that an individual may have multiple health concerns concurrently.

## 2018-04-03 ENCOUNTER — OFFICE VISIT (OUTPATIENT)
Dept: GASTROENTEROLOGY | Facility: CLINIC | Age: 18
End: 2018-04-03
Payer: COMMERCIAL

## 2018-04-03 VITALS
HEIGHT: 62 IN | DIASTOLIC BLOOD PRESSURE: 60 MMHG | HEART RATE: 78 BPM | SYSTOLIC BLOOD PRESSURE: 100 MMHG | BODY MASS INDEX: 17.64 KG/M2 | WEIGHT: 95.88 LBS

## 2018-04-03 DIAGNOSIS — K59.00 CONSTIPATION, UNSPECIFIED CONSTIPATION TYPE: ICD-10-CM

## 2018-04-03 DIAGNOSIS — K21.9 GASTROESOPHAGEAL REFLUX DISEASE, ESOPHAGITIS PRESENCE NOT SPECIFIED: Primary | ICD-10-CM

## 2018-04-03 LAB
C TRACH DNA SPEC QL NAA+PROBE: NOT DETECTED
N GONORRHOEA DNA SPEC QL NAA+PROBE: NOT DETECTED

## 2018-04-03 PROCEDURE — 99999 PR PBB SHADOW E&M-EST. PATIENT-LVL III: CPT | Mod: PBBFAC,,, | Performed by: NURSE PRACTITIONER

## 2018-04-03 PROCEDURE — 99214 OFFICE O/P EST MOD 30 MIN: CPT | Mod: S$GLB,,, | Performed by: NURSE PRACTITIONER

## 2018-04-03 RX ORDER — OMEPRAZOLE 40 MG/1
40 CAPSULE, DELAYED RELEASE ORAL DAILY
Qty: 30 CAPSULE | Refills: 2 | Status: SHIPPED | OUTPATIENT
Start: 2018-04-03 | End: 2018-06-04 | Stop reason: SDUPTHER

## 2018-04-03 NOTE — PROGRESS NOTES
Clinic Follow Up:  [facility] Follow Up Note    Reason for Follow Up:  The primary encounter diagnosis was Gastroesophageal reflux disease, esophagitis presence not specified. A diagnosis of Constipation, unspecified constipation type was also pertinent to this visit.    PCP: Jamal Gunn       HPI:  This is a 18 y.o. female here for follow up of the above  Pt states that since her last visit, she has not had any significant improvement in the nausea and vomiting.   She has been taking the Miralax daily and has had improvement in her constipation.  Having a BM daily.  The bloating continues, however.   She has had a 5 pound weight loss since January.   Her anxiety continues to affect her ADLs.       Review of Systems   Constitutional: Positive for weight loss. Negative for chills, fever and malaise/fatigue.   Respiratory: Negative for cough.    Cardiovascular: Negative for chest pain.   Gastrointestinal:        Per HPI   Musculoskeletal: Negative for myalgias.   Skin: Negative for itching and rash.   Neurological: Negative for headaches.   Psychiatric/Behavioral: The patient is nervous/anxious.        Medical History:  History reviewed. No pertinent past medical history.    Surgical History:   History reviewed. No pertinent surgical history.    Family History:   Family History   Problem Relation Age of Onset    Fibromyalgia Mother     MARJORIE disease Mother     Hashimoto's thyroiditis Father     Heart disease Maternal Grandfather     Diabetes Paternal Grandmother     Liver disease Paternal Grandmother     Heart disease Paternal Grandfather        Social History:   Social History   Substance Use Topics    Smoking status: Never Smoker    Smokeless tobacco: Never Used    Alcohol use No       Allergies: Reviewed    Home Medications:  Current Outpatient Prescriptions on File Prior to Visit   Medication Sig Dispense Refill    ondansetron (ZOFRAN-ODT) 8 MG TbDL Take 1 tablet (8 mg total) by  "mouth 3 (three) times daily as needed. 20 tablet 0    busPIRone (BUSPAR) 5 MG Tab Take 1 tablet (5 mg total) by mouth 2 (two) times daily. 60 tablet 2     No current facility-administered medications on file prior to visit.        Physical Exam:  Vital Signs:  /60   Pulse 78   Ht 5' 2" (1.575 m)   Wt 43.5 kg (95 lb 14.4 oz)   LMP 03/27/2018   BMI 17.54 kg/m²   Body mass index is 17.54 kg/m².  Physical Exam   Constitutional: She is oriented to person, place, and time. She appears well-developed and well-nourished.   HENT:   Head: Normocephalic.   Eyes: No scleral icterus.   Neck: Normal range of motion.   Cardiovascular: Normal rate and regular rhythm.    Pulmonary/Chest: Effort normal and breath sounds normal.   Abdominal: Soft. Bowel sounds are normal. She exhibits no distension. There is no tenderness.   Musculoskeletal: Normal range of motion.   Neurological: She is alert and oriented to person, place, and time.   Skin: Skin is warm and dry.   Psychiatric: She has a normal mood and affect.   Vitals reviewed.      Labs: Pertinent labs reviewed.      Assessment:  1. Gastroesophageal reflux disease, esophagitis presence not specified    2. Constipation, unspecified constipation type        Recommendations:  Gastroesophageal reflux disease, esophagitis presence not specified  - Symptoms are made worse with anxiety.  Continue working with Psych to improve anxiety  - Will start on PPI once daily  - Check for H. Pylori.  Treat if positive.  - If no improvement at F/U visit, will plan for EGD  -     omeprazole (PRILOSEC) 40 MG capsule; Take 1 capsule (40 mg total) by mouth once daily.  Dispense: 30 capsule; Refill: 2  -     H.Pylori Antibody IgG; Future; Expected date: 04/03/2018    Constipation, unspecified constipation type  - Continue Miralax.  - Increase water and dietary fiber.           Return to Clinic:    Follow-up in about 8 weeks (around 5/29/2018).      "

## 2018-04-04 ENCOUNTER — TELEPHONE (OUTPATIENT)
Dept: ALLERGY | Facility: CLINIC | Age: 18
End: 2018-04-04

## 2018-04-04 NOTE — TELEPHONE ENCOUNTER
This note was dictated using voice recognition software and may contain errors.    I spoke with her on the telephone on Wednesday, April 4, 2018.  We completed our conversation and 4:17 PM.  I reviewed with her the results of the laboratory tests which I had ordered at the time of her appointment with me on April 2.  Results were normal negative or satisfactory.  Should she have additional questions or concerns she was instructed to call or schedule return appointment.

## 2018-04-05 ENCOUNTER — TELEPHONE (OUTPATIENT)
Dept: GASTROENTEROLOGY | Facility: CLINIC | Age: 18
End: 2018-04-05

## 2018-04-05 NOTE — TELEPHONE ENCOUNTER
Returned pt's call.  It appears that someone from Hilda Pagan's office had left her a message to call back in regards to her H-Pylori testing.  I called her back and gave her the negative result.  She has a f/u appt on 05/23/2018.

## 2018-04-05 NOTE — TELEPHONE ENCOUNTER
----- Message from Mateo Russell sent at 4/5/2018 10:20 AM CDT -----  Contact: pt  She's calling in regards to a missed call, [phone] (home)

## 2018-04-19 PROBLEM — F41.1 GAD (GENERALIZED ANXIETY DISORDER): Status: ACTIVE | Noted: 2018-04-19

## 2018-05-02 ENCOUNTER — OFFICE VISIT (OUTPATIENT)
Dept: PSYCHIATRY | Facility: CLINIC | Age: 18
End: 2018-05-02
Payer: COMMERCIAL

## 2018-05-02 DIAGNOSIS — F41.1 GAD (GENERALIZED ANXIETY DISORDER): Primary | ICD-10-CM

## 2018-05-02 DIAGNOSIS — Z63.9 FAMILY DYNAMICS PROBLEM: ICD-10-CM

## 2018-05-02 PROCEDURE — 90791 PSYCH DIAGNOSTIC EVALUATION: CPT | Mod: S$GLB,,, | Performed by: SOCIAL WORKER

## 2018-05-02 SDOH — SOCIAL DETERMINANTS OF HEALTH (SDOH): PROBLEM RELATED TO PRIMARY SUPPORT GROUP, UNSPECIFIED: Z63.9

## 2018-05-02 NOTE — PROGRESS NOTES
"Psychiatry Initial Visit (PhD/LCSW)  Diagnostic Interview - CPT 35436    Date: 5/2/2018    Site: [place]    Referral source:   Hilda Pagan NP    Clinical status of patient: Outpatient    Treasure Hernandez, a 18 y.o. female, for initial evaluation visit.  Met with patient.    Chief complaint/reason for encounter: anxiety and interpersonal    History of present illness:   18 year old  female presented to begin psychotherapy for anxiety.  Patient referred by nurse practitioner Hilda Pagan NP.  Seen for initial psychiatric medication management by Dr. Aguirre in this department.  Patient chief complaint is significant global anxiety for as long as she can remember, with a particular focus in more recent years on social anxiety aspects.  Patient described many, many situations in which she has found herself, when she worried intensely about being judged negatively by other people.  Described chronic discomfort in public places to the point of avoiding going out unless absolutely necessary.  Reported feeling physically tense "all the time."  She also reported a handful of panic episodes in the past, with symptoms of racing heart, heightened muscle tension and hyperventilation.  She reported she is triggered to tears easily by "any strong emotion," not necessary just sadness or frustration.  Patient stated she dropped out of [employer] after just one semester, finding it too difficulty to navigate the campus and the school administrative process with her anxiety symptoms.  Patient repeatedly questioning her own intelligence and competence, imagining other people to have "figured out"  life mysteries that elude her.  Patient also with chronic irritable digestive track issues, with suppressed appetite.  Endorsed some history of past passive thoughts of suicide, but denied any intent or plans or attempts.  She identified family dynamics issues that contribute to her stress; notably describing her " mother as intense, controlling, often dismissive of the patient in a way she believes contributes to her insecurity; she notes her parents have always argued a lot; sees her father as passive-aggressive.  Older brother with dramatic mood swings, and younger brother with pronounced anxiety, that the patient said her parents seem to be in denial about.  Also noted that her parents are Anabaptist in their professed beliefs, though not active in Yazidi, but that the patient has never believed their dmitry paradigm and identifies as non-Christianity, and she has never disclosed this to them.  She recently disclosed to them that she identifies as bisexual and said so far their reaction has been quite negative.  She feels in both these issues that she is walking on eggshells, anticipating judgment and rejection.  Patient denied any mood swings, significant depression, explosive anger, cognitive deficits, or substance abuse issues.  Identified therapeutic goals include general reduction in anxiety symptoms, improvement in coping skills and to build a better sense of self-confidence.      Pain: mild abdominal discomfort from digestive track problems    Symptoms:   · Mood:  Frequent tearfulness  · Anxiety: excessive anxiety/worry, restlessness/keyed up, muscle tension and panic attacks  · Substance abuse: denied  · Cognitive functioning: denied  · Health behaviors: noncontributory    Psychiatric history: Some psychotherapy in college while at [facility]. Currently receiving psychiatric medication management.    Medical history:  digestive system upset. --lactose intolerance and allergy to whey.    Family history of psychiatric illness: maternal grandmother and brother with bipolar disorder.  other brother with suspected chronic anxiety.    Social history (marriage, employment, etc.):  Grew up in [place], the middle of 3 siblings, with a brother 1 year older and brother 2 and a half years younger.  Raised by both biological  "parents,w ho are still together; reported parents argued frequently and chronically. Patient painfully shy her entire childhood.  Mother controlling, father passive-aggressive.  Denied any physical or sexual abuse.  Grandmother came to stay with the family for a year when patient was 11, following grandmother being left suddenly by grandfather and having a "neurotic breakdown.  Patient reported a small [place] of friends but good friendships--with other shy people.  Graduated high school and attended 1 semester college at \[employer]".  Patient reported she currently has a boyfriend of the past 2 years.  Identifies as bisexual.  Non-Buddhism.  Family tension with parents at the moment over both issues.  No  service history.  No gun ownership.      Substance use:   Alcohol: none   Drugs: none   Tobacco: none   Caffeine: not reported.    Current medications and drug reactions (include OTC, herbal): see medication list      Strengths and liabilities: Strength: Patient accepts guidance/feedback, Strength: Patient is motivated for change., Liability: Patient lacks social skills., Liability: Patient lacks coping skills.    Current Evaluation:     Mental Status Exam:  General Appearance:  unremarkable, age appropriate, casually dressed   Speech: normal tone, normal rate, normal pitch, normal volume      Level of Cooperation: cooperative      Thought Processes: goal-directed   Mood: anxious      Thought Content: normal, no suicidality, no homicidality, delusions, or paranoia   Affect: congruent and appropriate   Orientation: Oriented x3   Memory: recent and remote memory intact   Attention Span & Concentration: intact   Fund of General Knowledge: intact and appropriate to age and level of education   Abstract Reasoning: not formally assessed   Judgment & Insight: fair     Language  intact     Diagnostic Impression - Plan:       ICD-10-CM ICD-9-CM   1. ERIC (generalized anxiety disorder) F41.1 300.02   2. Family dynamics " problem Z63.9 V61.9       Plan:individual psychotherapy and medication management by physician    Return to Clinic: as scheduled, 5/16/18    Length of Service (minutes): 45

## 2018-05-16 ENCOUNTER — OFFICE VISIT (OUTPATIENT)
Dept: PSYCHIATRY | Facility: CLINIC | Age: 18
End: 2018-05-16
Payer: COMMERCIAL

## 2018-05-16 DIAGNOSIS — F41.1 GAD (GENERALIZED ANXIETY DISORDER): Primary | ICD-10-CM

## 2018-05-16 DIAGNOSIS — Z63.9 FAMILY DYNAMICS PROBLEM: ICD-10-CM

## 2018-05-16 PROCEDURE — 90834 PSYTX W PT 45 MINUTES: CPT | Mod: S$GLB,,, | Performed by: SOCIAL WORKER

## 2018-05-16 SDOH — SOCIAL DETERMINANTS OF HEALTH (SDOH): PROBLEM RELATED TO PRIMARY SUPPORT GROUP, UNSPECIFIED: Z63.9

## 2018-05-23 ENCOUNTER — OFFICE VISIT (OUTPATIENT)
Dept: GASTROENTEROLOGY | Facility: CLINIC | Age: 18
End: 2018-05-23
Payer: COMMERCIAL

## 2018-05-23 ENCOUNTER — OFFICE VISIT (OUTPATIENT)
Dept: PSYCHIATRY | Facility: CLINIC | Age: 18
End: 2018-05-23
Payer: COMMERCIAL

## 2018-05-23 VITALS
HEIGHT: 62 IN | HEART RATE: 70 BPM | SYSTOLIC BLOOD PRESSURE: 110 MMHG | WEIGHT: 98.75 LBS | BODY MASS INDEX: 18.17 KG/M2 | DIASTOLIC BLOOD PRESSURE: 70 MMHG

## 2018-05-23 DIAGNOSIS — K59.00 CONSTIPATION, UNSPECIFIED CONSTIPATION TYPE: ICD-10-CM

## 2018-05-23 DIAGNOSIS — K21.9 GASTROESOPHAGEAL REFLUX DISEASE, ESOPHAGITIS PRESENCE NOT SPECIFIED: Primary | ICD-10-CM

## 2018-05-23 DIAGNOSIS — F41.1 GAD (GENERALIZED ANXIETY DISORDER): Primary | ICD-10-CM

## 2018-05-23 PROCEDURE — 99214 OFFICE O/P EST MOD 30 MIN: CPT | Mod: S$GLB,,, | Performed by: NURSE PRACTITIONER

## 2018-05-23 PROCEDURE — 99999 PR PBB SHADOW E&M-EST. PATIENT-LVL III: CPT | Mod: PBBFAC,,, | Performed by: NURSE PRACTITIONER

## 2018-05-23 PROCEDURE — 99213 OFFICE O/P EST LOW 20 MIN: CPT | Mod: S$GLB,,, | Performed by: PSYCHIATRY & NEUROLOGY

## 2018-05-23 PROCEDURE — 3008F BODY MASS INDEX DOCD: CPT | Mod: CPTII,S$GLB,, | Performed by: NURSE PRACTITIONER

## 2018-05-23 RX ORDER — BUSPIRONE HYDROCHLORIDE 10 MG/1
10 TABLET ORAL 2 TIMES DAILY
Qty: 60 TABLET | Refills: 2 | Status: SHIPPED | OUTPATIENT
Start: 2018-05-23 | End: 2018-07-17 | Stop reason: DRUGHIGH

## 2018-05-23 RX ORDER — POLYETHYLENE GLYCOL 3350 17 G/17G
17 POWDER, FOR SOLUTION ORAL DAILY
COMMUNITY

## 2018-05-23 NOTE — PROGRESS NOTES
"Outpatient Psychiatry Follow-up Visit (MD/NP)    5/23/2018    Treasure Hernandez, a 18 y.o. female, presenting for follow-up visit. Met with patient.    Reason for Encounter: Patient complains of anxiety.     IntervalHx: Patient seen and interviewed for follow-up, about 2 months since initial visit. Reports moods are moderately improved. Driving again, less avoidant of traveling, less obsessive with regard to anxiety in general, interacting with others more easily. Participating in psychotherapy and reports good rapport with Mr. Palacios. Applying for jobs and trying to move out, hopes eventually to be able to move to [place] to live with boyfriend (not imminent). Health otherwise is good. No side effect problems with buspirone. Not taking escitalopram.     Background: Patient is a 19 y/o F with hx of anxiety, presents for evaluation, reports anxiety - "most of my life". Worse since she's gone away to college, worries about academic performance ("everyone thought I was smart in high school". "takes me longer than others to learn what I need to learn", "everyone was better than me at everything". "I didn't know what I was doing"), though on exploration, she recognizes that these are exaggerated ideas. Also anxiety about social interactions. Feels anxious particularly at school, mostly ok when at home. Describes - Unable to control worry, trouble relaxing, restless - daily; overworries, nervous most days. Irritable, apprehensive some days; ERIC-7 = 15. Also describes problems falling and staying asleep, sad > 1/2 time. Decreased appetite and small weight loss. Concentration problems, guilt, decreased interest, anergia, slowing, restlessness. qids = 16. PsychHx: Psychotherapy at [facility] student mental health in past. Past suicidal fantasies. No suicidal behaviors. Wouldn't want to cause problems for people around her. No AVH. No delusions. No nate. No other problems. Sometimes depressed. Family hx: older brother and " "gm have bipolar disorder. GM was admitted to psych hospital before finally acknowledged problem. She suspects younger brother has anxiety disorder like herself. SocialHx: grew up with both parents. Always been shy, "scared of everything". "instead of encouraging". anxiety bad - ended up staying in my room. Withdrew from school - 1.5 to 2 years ago. Babysitting, doing things for parents. Attempts to get a job every now & then. Spends time with friends. Lives with parents. [place] Lived at [facility] for 1 semester. Stopped going to classes and had to withdraw. "could have stayed. Parents haven't pushed her to fix the problem. "they'd rather push me out". "They're in denial". 3.25 GPA in HS "because I sometimes didn't do my homework". 32 on ACT (tied for 2nd highest in her school). Aspiration to open a restaurant. 2 brothers. Chronically nervous. Has a boyfriend. Met around time of [employer]. Good relationship. He's supportive and encouraging. He goes to college in [place]. Could function in high school. Not sure she'd be motivated to do online program. Doesn't follow-through with attempts to May try to babysit. MedHx: lactose intolerance, whey allergy. "lots of stomach problems".     Review Of Systems:     GENERAL:  No weight gain or loss  SKIN:  No rashes or lacerations  HEAD:  No headaches  EYES:  No exophthalmos, jaundice or blindness  EARS:  No dizziness, tinnitus or hearing loss  NOSE:  No changes in smell  MOUTH & THROAT:  No dyskinetic movements or obvious goiter  CHEST:  No shortness of breath, hyperventilation or cough  CARDIOVASCULAR:  No tachycardia or chest pain  ABDOMEN:  No nausea, vomiting, pain, constipation or diarrhea  URINARY:  No frequency, dysuria or sexual dysfunction  ENDOCRINE:  No polydipsia, polyuria  MUSCULOSKELETAL:  No pain or stiffness of the joints  NEUROLOGIC:  No weakness, sensory changes, seizures, confusion, memory loss, tremor or other abnormal movements    Current Evaluation: " "    Nutritional Screening: Considering the patient's height and weight, medications, medical history and preferences, should a referral be made to the dietitian? no    Constitutional  Vitals:  Most recent vital signs, dated less than 90 days prior to this appointment, were not reviewed.    There were no vitals filed for this visit.     General:  unremarkable, age appropriate     Musculoskeletal  Muscle Strength/Tone:  no tremor, no tic   Gait & Station:  non-ataxic     Psychiatric  Appearance: casually dressed & groomed;   Behavior: calm,   Cooperation: cooperative with assessment  Speech: normal rate, volume, tone  Thought Process: linear, goal-directed  Thought Content: No suicidal or homicidal ideation; no delusions  Affect: anxious  Mood: "better"  Perceptions: No auditory or visual hallucinations  Level of Consciousness: alert throughout interview  Insight: fair  Cognition: Oriented to person, place, time, & situation  Memory: no apparent deficits to general clinical interview; not formally assessed  Attention/Concentration: no apparent deficits to general clinical interview; not formally assessed  Fund of Knowledge: average by vocabulary/education    Laboratory Data  No visits with results within 1 Month(s) from this visit.   Latest known visit with results is:   Lab Visit on 04/02/2018   Component Date Value Ref Range Status    Sodium 04/02/2018 141  136 - 145 mmol/L Final    Potassium 04/02/2018 3.8  3.5 - 5.1 mmol/L Final    Chloride 04/02/2018 107  95 - 110 mmol/L Final    CO2 04/02/2018 25  23 - 29 mmol/L Final    Glucose 04/02/2018 85  70 - 110 mg/dL Final    BUN, Bld 04/02/2018 17  6 - 20 mg/dL Final    Creatinine 04/02/2018 0.7  0.5 - 1.4 mg/dL Final    Calcium 04/02/2018 9.5  8.7 - 10.5 mg/dL Final    Total Protein 04/02/2018 7.5  6.0 - 8.4 g/dL Final    Albumin 04/02/2018 4.5  3.5 - 5.2 g/dL Final    Total Bilirubin 04/02/2018 0.3  0.1 - 1.0 mg/dL Final    Alkaline Phosphatase 04/02/2018 " 49* 55 - 135 U/L Final    AST 04/02/2018 20  10 - 40 U/L Final    ALT 04/02/2018 13  10 - 44 U/L Final    Anion Gap 04/02/2018 9  8 - 16 mmol/L Final    eGFR if African American 04/02/2018 >60.0  >60 mL/min/1.73 m^2 Final    eGFR if non African American 04/02/2018 >60.0  >60 mL/min/1.73 m^2 Final    hCG Quant 04/02/2018 <1.2  See Text mIU/mL Final    Hepatitis B Surface Ag 04/02/2018 Negative   Final    Hep B C IgM 04/02/2018 Negative   Final    Hep A IgM 04/02/2018 Negative   Final    Hepatitis C Ab 04/02/2018 Negative   Final    RPR 04/02/2018 Non-reactive  Non-reactive Final    HIV 1/2 Ag/Ab 04/02/2018 Negative  Negative Final    H Pylori IgG Interp 04/02/2018 Negative  Negative Final         Medications  Outpatient Encounter Prescriptions as of 5/23/2018   Medication Sig Dispense Refill    busPIRone (BUSPAR) 5 MG Tab Take 1 tablet (5 mg total) by mouth 2 (two) times daily. 60 tablet 2    omeprazole (PRILOSEC) 40 MG capsule Take 1 capsule (40 mg total) by mouth once daily. 30 capsule 2    ondansetron (ZOFRAN-ODT) 8 MG TbDL Take 1 tablet (8 mg total) by mouth 3 (three) times daily as needed. 20 tablet 0     No facility-administered encounter medications on file as of 5/23/2018.      Assessment - Diagnosis - Goals:     Impression: 17 y/o F with generalized anxiety disorder, ongoing distress and impairment. Would benefit from trial of SSRI and psychotherapy. Describes improvement with regard to distress and dysfunction.     Dx: generalized anxiety disorder    Treatment Goals:  Specify outcomes written in observable, behavioral terms: decrease subjective anxiety reports, functioning    Treatment Plan/Recommendations:   · Trial of escitalopram. Discussed risks, benefits, and alternatives to treatment plan documented above with patient. I answered all patient questions related to this plan and patient expressed understanding and agreement.   · Psychotherapy referral.     Return to Clinic: 2  months    Counseling time: 5 minutes  Total time: 20 minutes    ABUNDIO Pierre MD

## 2018-05-23 NOTE — PATIENT INSTRUCTIONS
Decrease omeprazole to once every other day for 2 weeks, then every 3rd day for 2 weeks then stop.

## 2018-05-23 NOTE — PROGRESS NOTES
Clinic Follow Up:  [facility] Follow Up Note    Reason for Follow Up:  The primary encounter diagnosis was Gastroesophageal reflux disease, esophagitis presence not specified. A diagnosis of Constipation, unspecified constipation type was also pertinent to this visit.    PCP: Jamal Gunn       HPI:  This is a 18 y.o. female here for follow up of the above  She states that since her last visit, she has been feeling overall well without any new complaints.  She has been taking PPI daily without any Complaints.  Has had a few episodes of breakthrough reflux, diet and anxiety dependant.  Constipation is well controlled with miralax.       Review of Systems   Constitutional: Negative for chills, fever, malaise/fatigue and weight loss.   Respiratory: Negative for cough.    Cardiovascular: Negative for chest pain.   Gastrointestinal:        Per HPI   Musculoskeletal: Negative for myalgias.   Skin: Negative for itching and rash.   Neurological: Negative for headaches.   Psychiatric/Behavioral: The patient is not nervous/anxious.        Medical History:  Past Medical History:   Diagnosis Date    Anxiety        Surgical History:   History reviewed. No pertinent surgical history.    Family History:   Family History   Problem Relation Age of Onset    Fibromyalgia Mother     MARJORIE disease Mother     Hashimoto's thyroiditis Father     Bipolar disorder Brother     Bipolar disorder Maternal Grandmother     Heart disease Maternal Grandfather     Diabetes Paternal Grandmother     Liver disease Paternal Grandmother     Heart disease Paternal Grandfather     Anxiety disorder Brother        Social History:   Social History   Substance Use Topics    Smoking status: Never Smoker    Smokeless tobacco: Never Used    Alcohol use No       Allergies: Reviewed    Home Medications:  Current Outpatient Prescriptions on File Prior to Visit   Medication Sig Dispense Refill    busPIRone (BUSPAR) 5 MG Tab Take 1  "tablet (5 mg total) by mouth 2 (two) times daily. 60 tablet 2    omeprazole (PRILOSEC) 40 MG capsule Take 1 capsule (40 mg total) by mouth once daily. 30 capsule 2    ondansetron (ZOFRAN-ODT) 8 MG TbDL Take 1 tablet (8 mg total) by mouth 3 (three) times daily as needed. 20 tablet 0     No current facility-administered medications on file prior to visit.        Physical Exam:  Vital Signs:  /70   Pulse 70   Ht 5' 2" (1.575 m)   Wt 44.8 kg (98 lb 12.3 oz)   BMI 18.06 kg/m²   Body mass index is 18.06 kg/m².  Physical Exam   Constitutional: She is oriented to person, place, and time. She appears well-developed and well-nourished.   HENT:   Head: Normocephalic.   Eyes: No scleral icterus.   Neck: Normal range of motion.   Cardiovascular: Normal rate and regular rhythm.    Pulmonary/Chest: Effort normal and breath sounds normal.   Abdominal: Soft. Bowel sounds are normal. She exhibits no distension. There is no tenderness.   Musculoskeletal: Normal range of motion.   Neurological: She is alert and oriented to person, place, and time.   Skin: Skin is warm and dry.   Psychiatric: She has a normal mood and affect.   Vitals reviewed.      Labs: Pertinent labs reviewed.      Assessment:  1. Gastroesophageal reflux disease, esophagitis presence not specified    2. Constipation, unspecified constipation type        Recommendations:  - stable  - Continue medications  - Can begin decreasing PPI to every other day and taper to stop.  If symptoms return, will start on lower dose daily PPI.   Gastroesophageal reflux disease, esophagitis presence not specified    Constipation, unspecified constipation type          Return to Clinic:    Follow-up if symptoms worsen or fail to improve.      "

## 2018-06-04 DIAGNOSIS — K21.9 GASTROESOPHAGEAL REFLUX DISEASE, ESOPHAGITIS PRESENCE NOT SPECIFIED: ICD-10-CM

## 2018-06-04 RX ORDER — OMEPRAZOLE 40 MG/1
40 CAPSULE, DELAYED RELEASE ORAL DAILY
Qty: 30 CAPSULE | Refills: 2 | Status: SHIPPED | OUTPATIENT
Start: 2018-06-04 | End: 2019-06-04

## 2018-06-18 ENCOUNTER — OFFICE VISIT (OUTPATIENT)
Dept: PSYCHIATRY | Facility: CLINIC | Age: 18
End: 2018-06-18
Payer: COMMERCIAL

## 2018-06-18 DIAGNOSIS — Z63.9 FAMILY DYSFUNCTION: ICD-10-CM

## 2018-06-18 DIAGNOSIS — F41.1 GAD (GENERALIZED ANXIETY DISORDER): Primary | ICD-10-CM

## 2018-06-18 PROCEDURE — 90834 PSYTX W PT 45 MINUTES: CPT | Mod: S$GLB,,, | Performed by: SOCIAL WORKER

## 2018-06-18 SDOH — SOCIAL DETERMINANTS OF HEALTH (SDOH): PROBLEM RELATED TO PRIMARY SUPPORT GROUP, UNSPECIFIED: Z63.9

## 2018-06-19 NOTE — PROGRESS NOTES
"Individual Psychotherapy (PhD/LCSW)    5/16/2018    [facility]         Therapeutic Intervention: Met with patient.  Outpatient - Insight oriented psychotherapy 45 min - CPT code 29448 and Outpatient - Supportive psychotherapy 45 min - CPT Code 29379    Chief complaint/reason for encounter: anxiety and interpersonal     Interval history and content of current session:   18 year old female patient returned for follow up psychotherapy to address pervasive anxiety, family relationship stress and management of life stressors.  Patient presented alert and oriented, casually dressed, smiling but physically tense posture--hunched shoulders.  She reported she is looking forward to her boyfriend, Colby, coming for a week from [place], where he is in college.  Patient expressed a lot of anxiety about summer work, saying it is lined up but not yet organized.  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  Patient reported family dynamics remain largely negative.  She feels emotionally estranged from her parents, especially her mother, whom she said is the more controlling of the two.  Patient identifies as "bi-affectionate" (saying she is not really interested in sex) and religiously agnostic, and her parents are conservative, devoutly Mormon.  She feels she has no way to talk to them about anything important in her life right now.  She talked about her boyfriend, whom she described as "super-calm" and a good counterbalance to her anxiety.  Patient denied any new stressors, any si/hi, psychosis, mood swings, or substance abuse.  Working on gaining confidence and identifying a plan for moving toward autonomy from her parents, with whom she still resides and on whom she depends for financial basics.  Supportive therapy provided.  Scheduled follow up on 6/18/2018.     Treatment plan:  · Target symptoms: anxiety , adjustment, family relationship tensions  · Why chosen " therapy is appropriate versus another modality: relevant to diagnosis, patient responds to this modality  · Outcome monitoring methods: self-report, observation  · Therapeutic intervention type: insight oriented psychotherapy, supportive psychotherapy    Risk parameters:  Patient reports no suicidal ideation  Patient reports no homicidal ideation  Patient reports no self-injurious behavior  Patient reports no violent behavior    Verbal deficits: None    Patient's response to intervention:  The patient's response to intervention is accepting.    Progress toward goals and other mental status changes:  The patient's progress toward goals is fair .    Diagnosis:     ICD-10-CM ICD-9-CM   1. ERIC (generalized anxiety disorder) F41.1 300.02   2. Family dynamics problem Z63.9 V61.9       Plan:  individual psychotherapy    Return to clinic: as scheduled    Length of Service (minutes): 45

## 2018-07-01 NOTE — PROGRESS NOTES
Individual Psychotherapy (PhD/LCSW)    6/18/2018    Site:  [facility]         Therapeutic Intervention: Met with patient.  Outpatient - Insight oriented psychotherapy 45 min - CPT code 21184 and Outpatient - Supportive psychotherapy 45 min - CPT Code 31144    Chief complaint/reason for encounter: anxiety and interpersonal     Interval history and content of current session:   18 year old female patient returned for follow up psychotherapy to address pervasive anxiety, family relationship stress and management of life stressors.  Patient presented alert and oriented, casually dressed.  Continued anxiety but reporting her medications are helping significantly.  Patient reported on current stressors.  One is financial.  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  Patient said she realized in going there it was heavily infused with religiosity and did not feel comfortable for her, so she said it may have been a hidden plus that she did not get hired  But she is without work and is looking and under pressure.  She said she feels a certain level of peace with just the decision to move out and away, though logistically she is not to that point yet.  She talked of happily having attended a [place] [employer] [place] event and enjoying it a lot. Patient progressing with affirmation with her own sexual identity.  Connecting with friends she can talk to.  Supportive therapy provided.  Scheduled follow up on 7/2/2018.     Treatment plan:  · Target symptoms: anxiety , adjustment, family relationship tensions  · Why chosen therapy is appropriate versus another modality: relevant to diagnosis, patient responds to this modality  · Outcome monitoring methods: self-report, observation  · Therapeutic intervention type: insight oriented psychotherapy, supportive psychotherapy    Risk parameters:  Patient reports  no suicidal ideation  Patient reports no homicidal ideation  Patient reports no self-injurious behavior  Patient reports no violent behavior    Verbal deficits: None    Patient's response to intervention:  The patient's response to intervention is accepting.    Progress toward goals and other mental status changes:  The patient's progress toward goals is fair .    Diagnosis:     ICD-10-CM ICD-9-CM   1. ERIC (generalized anxiety disorder) F41.1 300.02   2. Family dysfunction Z63.9 V61.9       Plan:  individual psychotherapy    Return to clinic: as scheduled    Length of Service (minutes): 45

## 2018-07-02 ENCOUNTER — OFFICE VISIT (OUTPATIENT)
Dept: PSYCHIATRY | Facility: CLINIC | Age: 18
End: 2018-07-02
Payer: COMMERCIAL

## 2018-07-02 DIAGNOSIS — F41.1 GAD (GENERALIZED ANXIETY DISORDER): Primary | ICD-10-CM

## 2018-07-02 DIAGNOSIS — Z63.9 FAMILY DYSFUNCTION: ICD-10-CM

## 2018-07-02 PROCEDURE — 90834 PSYTX W PT 45 MINUTES: CPT | Mod: S$GLB,,, | Performed by: SOCIAL WORKER

## 2018-07-02 SDOH — SOCIAL DETERMINANTS OF HEALTH (SDOH): PROBLEM RELATED TO PRIMARY SUPPORT GROUP, UNSPECIFIED: Z63.9

## 2018-07-17 ENCOUNTER — OFFICE VISIT (OUTPATIENT)
Dept: PSYCHIATRY | Facility: CLINIC | Age: 18
End: 2018-07-17
Payer: COMMERCIAL

## 2018-07-17 DIAGNOSIS — F41.1 GAD (GENERALIZED ANXIETY DISORDER): Primary | ICD-10-CM

## 2018-07-17 DIAGNOSIS — F40.298 OTHER SPECIFIED PHOBIA: ICD-10-CM

## 2018-07-17 DIAGNOSIS — Z63.9 FAMILY DYSFUNCTION: ICD-10-CM

## 2018-07-17 PROCEDURE — 90834 PSYTX W PT 45 MINUTES: CPT | Mod: S$GLB,,, | Performed by: SOCIAL WORKER

## 2018-07-17 PROCEDURE — 99213 OFFICE O/P EST LOW 20 MIN: CPT | Mod: S$GLB,,, | Performed by: PSYCHIATRY & NEUROLOGY

## 2018-07-17 RX ORDER — BUSPIRONE HYDROCHLORIDE 10 MG/1
20 TABLET ORAL 2 TIMES DAILY
Qty: 120 TABLET | Refills: 2 | Status: SHIPPED | OUTPATIENT
Start: 2018-07-17 | End: 2018-09-14 | Stop reason: SDUPTHER

## 2018-07-17 SDOH — SOCIAL DETERMINANTS OF HEALTH (SDOH): PROBLEM RELATED TO PRIMARY SUPPORT GROUP, UNSPECIFIED: Z63.9

## 2018-07-17 NOTE — PROGRESS NOTES
Individual Psychotherapy (PhD/LCSW)    7/2/2018    [facility]         Therapeutic Intervention: Met with patient.  Outpatient - Insight oriented psychotherapy 45 min - CPT code 32047 and Outpatient - Supportive psychotherapy 45 min - CPT Code 09657    Chief complaint/reason for encounter: anxiety and interpersonal     Interval history and content of current session:   18 year old female patient returned for follow up psychotherapy to address pervasive anxiety, family relationship stress and management of life stressors.  Patient last seen 6/18/18.  Patient presented alert and oriented, casually dressed.  She reported on her job hunt, which she endorsed as being intimidating to her.  She has been focusing on one job prospect at a time, applying, then anxiously waiting to hear what becomes of her application, rather than applying multiple places.  She said the CC's she applied for hired someone else.  Said her boyfriend has expressed he is anxious for her to find something, worried at the prospect of his family judging her negatively if she is still unemployed when he introduces her to them.  Patient is queer-identified, though still sorting through her feelings about her own sexual identity.  Talked about continued little steps she is taking into connecting with LGBT community.  Patient denied any si/hi, psychosis, cognitive deficit, mood swings, or substance abuse.  Supportive therapy provided.  Scheduled follow up on 7/17/2018.     Treatment plan:  · Target symptoms: anxiety , adjustment, family relationship tensions  · Why chosen therapy is appropriate versus another modality: relevant to diagnosis, patient responds to this modality  · Outcome monitoring methods: self-report, observation  · Therapeutic intervention type: insight oriented psychotherapy, supportive psychotherapy    Risk parameters:  Patient reports no suicidal ideation  Patient reports no homicidal ideation  Patient reports no self-injurious  behavior  Patient reports no violent behavior    Verbal deficits: None    Patient's response to intervention:  The patient's response to intervention is accepting.    Progress toward goals and other mental status changes:  The patient's progress toward goals is fair .    Diagnosis:     ICD-10-CM ICD-9-CM   1. ERIC (generalized anxiety disorder) F41.1 300.02   2. Family dysfunction Z63.9 V61.9       Plan:  individual psychotherapy    Return to clinic: as scheduled    Length of Service (minutes): 45

## 2018-07-17 NOTE — PROGRESS NOTES
"Outpatient Psychiatry Follow-up Visit (MD/NP)    7/17/2018    Treasure Hernandez, a 18 y.o. female, presenting for follow-up visit. Met with patient.    Reason for Encounter: Patient complains of anxiety.     IntervalHx: Patient seen and interviewed for follow-up, about 2 months since initial visit. Reports continues better than prior to baseline. Confronting fears and reducing avoidance. Continues to have problems with apprehensions, overworry, tension. less anxious. More comfortable. Health is ok. Mild nausea with buspirone was transient following not tolerating lexapro. Driving a lot. Still anxious. Looking for a job to save money for anticipated move to [place] (would live with BF and 2 of his friends).     Background: Patient is a 17 y/o F with hx of anxiety, presents for evaluation, reports anxiety - "most of my life". Worse since she's gone away to college, worries about academic performance ("everyone thought I was smart in high school". "takes me longer than others to learn what I need to learn", "everyone was better than me at everything". "I didn't know what I was doing"), though on exploration, she recognizes that these are exaggerated ideas. Also anxiety about social interactions. Feels anxious particularly at school, mostly ok when at home. Describes - Unable to control worry, trouble relaxing, restless - daily; overworries, nervous most days. Irritable, apprehensive some days; ERIC-7 = 15. Also describes problems falling and staying asleep, sad > 1/2 time. Decreased appetite and small weight loss. Concentration problems, guilt, decreased interest, anergia, slowing, restlessness. qids = 16. PsychHx: Psychotherapy at [facility] student mental health in past. Past suicidal fantasies. No suicidal behaviors. Wouldn't want to cause problems for people around her. No AVH. No delusions. No nate. No other problems. Sometimes depressed. Family hx: older brother & gm have bipolar disorder. GM was admitted to psych " "hospital before finally acknowledged problem. She suspects younger brother has anxiety disorder like herself. SocialHx: grew up with both parents. Always been shy, "scared of everything". "instead of encouraging". anxiety bad - ended up staying in my room. Withdrew from school - 1.5 to 2 years ago. Babysitting, doing things for parents. Attempts to get a job every now & then. Spends time with friends. Lives with parents. [place] Lived at [facility] for 1 semester. Stopped going to classes and had to withdraw. "could have stayed. Parents haven't pushed her to fix the problem. "they'd rather push me out". "They're in denial". 3.25 GPA in HS "because I sometimes didn't do my homework". 32 on ACT (tied for 2nd highest in her school). Aspiration to open a restaurant. 2 brothers. Chronically nervous. Has a boyfriend. Met around time of [employer]. Good relationship. He's supportive and encouraging. He goes to college in [place]. Could function in high school. Not sure she'd be motivated to do online program. Doesn't follow-through with attempts to [place] try to babysit. MedHx: lactose intolerance, whey allergy. "lots of stomach problems".     Review Of Systems:     GENERAL:  No weight gain or loss  SKIN:  No rashes or lacerations  HEAD:  No headaches  CHEST:  No shortness of breath, hyperventilation or cough  CARDIOVASCULAR:  No tachycardia or chest pain  ABDOMEN:  No nausea, vomiting, pain, constipation or diarrhea  URINARY:  No frequency, dysuria or sexual dysfunction  ENDOCRINE:  No polydipsia, polyuria  MUSCULOSKELETAL:  No pain or stiffness of the joints  NEUROLOGIC:  No weakness, sensory changes, seizures, confusion, memory loss, tremor or other abnormal movements    Current Evaluation:     Nutritional Screening: Considering the patient's height and weight, medications, medical history and preferences, should a referral be made to the dietitian? no    Constitutional  Vitals:  Most recent vital signs, dated less than 90 days prior " "to this appointment, were not reviewed.    There were no vitals filed for this visit.     General:  unremarkable, age appropriate     Musculoskeletal  Muscle Strength/Tone:  no tremor, no tic   Gait & Station:  non-ataxic     Psychiatric  Appearance: casually dressed & groomed;   Behavior: calm,   Cooperation: cooperative with assessment  Speech: normal rate, volume, tone  Thought Process: linear, goal-directed  Thought Content: No suicidal or homicidal ideation; no delusions  Affect: anxious  Mood: "better"  Perceptions: No auditory or visual hallucinations  Level of Consciousness: alert throughout interview  Insight: fair  Cognition: Oriented to person, place, time, & situation  Memory: no apparent deficits to general clinical interview; not formally assessed  Attention/Concentration: no apparent deficits to general clinical interview; not formally assessed  Fund of Knowledge: average by vocabulary/education    Laboratory Data  No visits with results within 1 Month(s) from this visit.   Latest known visit with results is:   Lab Visit on 04/02/2018   Component Date Value Ref Range Status    Sodium 04/02/2018 141  136 - 145 mmol/L Final    Potassium 04/02/2018 3.8  3.5 - 5.1 mmol/L Final    Chloride 04/02/2018 107  95 - 110 mmol/L Final    CO2 04/02/2018 25  23 - 29 mmol/L Final    Glucose 04/02/2018 85  70 - 110 mg/dL Final    BUN, Bld 04/02/2018 17  6 - 20 mg/dL Final    Creatinine 04/02/2018 0.7  0.5 - 1.4 mg/dL Final    Calcium 04/02/2018 9.5  8.7 - 10.5 mg/dL Final    Total Protein 04/02/2018 7.5  6.0 - 8.4 g/dL Final    Albumin 04/02/2018 4.5  3.5 - 5.2 g/dL Final    Total Bilirubin 04/02/2018 0.3  0.1 - 1.0 mg/dL Final    Alkaline Phosphatase 04/02/2018 49* 55 - 135 U/L Final    AST 04/02/2018 20  10 - 40 U/L Final    ALT 04/02/2018 13  10 - 44 U/L Final    Anion Gap 04/02/2018 9  8 - 16 mmol/L Final    eGFR if African American 04/02/2018 >60.0  >60 mL/min/1.73 m^2 Final    eGFR if non "  04/02/2018 >60.0  >60 mL/min/1.73 m^2 Final    hCG Quant 04/02/2018 <1.2  See Text mIU/mL Final    Hepatitis B Surface Ag 04/02/2018 Negative   Final    Hep B C IgM 04/02/2018 Negative   Final    Hep A IgM 04/02/2018 Negative   Final    Hepatitis C Ab 04/02/2018 Negative   Final    RPR 04/02/2018 Non-reactive  Non-reactive Final    HIV 1/2 Ag/Ab 04/02/2018 Negative  Negative Final    H Pylori IgG Interp 04/02/2018 Negative  Negative Final     Medications  Outpatient Encounter Prescriptions as of 7/17/2018   Medication Sig Dispense Refill    busPIRone (BUSPAR) 10 MG tablet Take 1 tablet (10 mg total) by mouth 2 (two) times daily. 60 tablet 2    omeprazole (PRILOSEC) 40 MG capsule Take 1 capsule (40 mg total) by mouth once daily. 30 capsule 2    ondansetron (ZOFRAN-ODT) 8 MG TbDL Take 1 tablet (8 mg total) by mouth 3 (three) times daily as needed. 20 tablet 0    polyethylene glycol (GLYCOLAX) 17 gram/dose powder Take 17 g by mouth once daily.       No facility-administered encounter medications on file as of 7/17/2018.      Assessment - Diagnosis - Goals:     Impression: 17 y/o F with generalized anxiety disorder, ongoing distress and impairment. Describes ongoing symptoms, clinical improvement with regard to distress and dysfunction. Adherent to medication and tolerating well.     Dx: generalized anxiety disorder    Treatment Goals:  Specify outcomes written in observable, behavioral terms: decrease subjective anxiety reports, functioning    Treatment Plan/Recommendations:   · Buspirone to 20 mg bid. Discussed risks, benefits, and alternatives to treatment plan documented above with patient. I answered all patient questions related to this plan and patient expressed understanding and agreement.   · Psychotherapy referral.     Return to Clinic: 2 months    Counseling time: 5 minutes  Total time: 20 minutes    ABUNDIO Pierre MD

## 2018-07-22 PROBLEM — F40.9 PHOBIA: Status: ACTIVE | Noted: 2018-07-22

## 2018-07-30 ENCOUNTER — OFFICE VISIT (OUTPATIENT)
Dept: PSYCHIATRY | Facility: CLINIC | Age: 18
End: 2018-07-30
Payer: COMMERCIAL

## 2018-07-30 DIAGNOSIS — F41.1 GENERALIZED ANXIETY DISORDER: Primary | ICD-10-CM

## 2018-07-30 DIAGNOSIS — Z63.9 FAMILY DYSFUNCTION: ICD-10-CM

## 2018-07-30 DIAGNOSIS — F41.0 PANIC DISORDER: ICD-10-CM

## 2018-07-30 PROCEDURE — 90834 PSYTX W PT 45 MINUTES: CPT | Mod: S$GLB,,, | Performed by: SOCIAL WORKER

## 2018-07-30 SDOH — SOCIAL DETERMINANTS OF HEALTH (SDOH): PROBLEM RELATED TO PRIMARY SUPPORT GROUP, UNSPECIFIED: Z63.9

## 2018-07-30 NOTE — PROGRESS NOTES
"Individual Psychotherapy (PhD/LCSW)    7/17/2018    [facility]         Therapeutic Intervention: Met with patient.  Outpatient - Insight oriented psychotherapy 45 min - CPT code 67792 and Outpatient - Supportive psychotherapy 45 min - CPT Code 05808    Chief complaint/reason for encounter: anxiety and interpersonal     Interval history and content of current session:   18 year old female patient returned for follow up psychotherapy to address pervasive anxiety, family relationship stress and management of life stressors.  Patient last seen 7/2/18.  She presented alert and oriented, appropriately groomed.  Talked about her mother's tactics she considers "controlling tactics."  Overbearing older brother, anxious younger brother.  Patient struggling with continued lack of confidence or sense of direction about life/career goals.  She said she is sitting out school until she can sort out her education and career goals better, so as not to waste money on college.  Talked about life experiences, or as she said, lack of them.  Noting things she has not accomplished, she identified that she cannot swim, has never learned to ride a bicycle, and only just obtained a 's license this past year.  Overriding sense of feeling intimidated by many things.  Not sure why, but she endorsed family that she never perceived as affirming of her; perhaps undermining her young child confidence.  She expressed continued interest in forcing social ties to elements of the local LGBT community, believing she may gain from them encouragement and affirmation for being her authentic self.  Patient denied any si/hi, psychosis, cognitive deficit, mood swings, or substance abuse.  Supportive therapy provided.  Scheduled follow up on 7/30/2018.     Treatment plan:  · Target symptoms: anxiety , adjustment, family relationship tensions  · Why chosen therapy is appropriate versus another modality: relevant to diagnosis, patient responds to " this modality  · Outcome monitoring methods: self-report, observation  · Therapeutic intervention type: insight oriented psychotherapy, supportive psychotherapy    Risk parameters:  Patient reports no suicidal ideation  Patient reports no homicidal ideation  Patient reports no self-injurious behavior  Patient reports no violent behavior    Verbal deficits: None    Patient's response to intervention:  The patient's response to intervention is accepting.    Progress toward goals and other mental status changes:  The patient's progress toward goals is fair .    Diagnosis:     ICD-10-CM ICD-9-CM   1. ERIC (generalized anxiety disorder) F41.1 300.02   2. Family dysfunction Z63.9 V61.9       Plan:  individual psychotherapy    Return to clinic: as scheduled    Length of Service (minutes): 45

## 2018-08-31 ENCOUNTER — OFFICE VISIT (OUTPATIENT)
Dept: PSYCHIATRY | Facility: CLINIC | Age: 18
End: 2018-08-31
Payer: COMMERCIAL

## 2018-08-31 DIAGNOSIS — F41.1 GAD (GENERALIZED ANXIETY DISORDER): Primary | ICD-10-CM

## 2018-08-31 DIAGNOSIS — Z63.9 FAMILY TENSION: ICD-10-CM

## 2018-08-31 PROCEDURE — 90834 PSYTX W PT 45 MINUTES: CPT | Mod: S$GLB,,, | Performed by: SOCIAL WORKER

## 2018-08-31 SDOH — SOCIAL DETERMINANTS OF HEALTH (SDOH): PROBLEM RELATED TO PRIMARY SUPPORT GROUP, UNSPECIFIED: Z63.9

## 2018-08-31 NOTE — PROGRESS NOTES
Individual Psychotherapy (PhD/LCSW)    7/30/2018    [facility]         Therapeutic Intervention: Met with patient.  Outpatient - Insight oriented psychotherapy 45 min - CPT code 63035 and Outpatient - Supportive psychotherapy 45 min - CPT Code 05380    Chief complaint/reason for encounter: anxiety and interpersonal     Interval history and content of current session:   (Late entry for 7/30/18)  18 year old female patient returned for follow up psychotherapy to address pervasive anxiety, family relationship stress and management of life stressors.  Previous session 7/17/18.  Patient presented alert and oriented, reporting feeling bothered and frustrated by aggressive racist expressions of sentiment on recent display by some relatives, which she did not know how to respond to.  Continuing her job search; a frustrating and drawn out process for her.  Talked about four different possibilities, a couple of them coffee shops, one computer game store and a andressa position for an 's office.  Patient continuing to target lack of confidence and embrace of personal identity, including sexual identity.  Working continues on connecting with supportive LGBT community.  Patient denied any si/hi, psychosis, cognitive deficit, mood swings, or substance abuse.  Supportive therapy provided.  Scheduled follow up on 8/31/2018.     Treatment plan:  · Target symptoms: anxiety , adjustment, family relationship tensions  · Why chosen therapy is appropriate versus another modality: relevant to diagnosis, patient responds to this modality  · Outcome monitoring methods: self-report, observation  · Therapeutic intervention type: insight oriented psychotherapy, supportive psychotherapy    Risk parameters:  Patient reports no suicidal ideation  Patient reports no homicidal ideation  Patient reports no self-injurious behavior  Patient reports no violent behavior    Verbal deficits: None    Patient's response to intervention:  The  patient's response to intervention is accepting.    Progress toward goals and other mental status changes:  The patient's progress toward goals is fair .    Diagnosis:     ICD-10-CM ICD-9-CM   1. Generalized anxiety disorder F41.1 300.02   2. Panic disorder F41.0 300.01   3. Family dysfunction Z63.9 V61.9       Plan:  individual psychotherapy    Return to clinic: as scheduled    Length of Service (minutes): 45

## 2018-09-12 NOTE — PROGRESS NOTES
"Outpatient Psychiatry Follow-up Visit (MD/NP)    9/14/2018    Treasure Hernandez, a 18 y.o. female, presenting for follow-up visit. Met with patient.    Reason for Encounter: Patient complains of anxiety.     IntervalHx: Patient seen and interviewed for follow-up, about 2 months since last visit. Feeling generally well. Continues to work [facility] [facility]. Will work at [employer], living with roommates for first time. Health is ok without new complaints, diagnoses, or medications. More anxious during transition. Psychotherapy helpful, encouraging. Relationship still ok with bf. Adherent with medication. Denies side effects.     Background: Patient is a 19 y/o F with hx of anxiety, presents for evaluation, reports anxiety - "most of my life". Worse since she's gone away to college, worries about academic performance ("everyone thought I was smart in high school". "takes me longer than others to learn what I need to learn", "everyone was better than me at everything". "I didn't know what I was doing"), though on exploration, she recognizes that these are exaggerated ideas. Also anxiety about social interactions. Feels anxious particularly at school, mostly ok when at home. Describes - Unable to control worry, trouble relaxing, restless - daily; overworries, nervous most days. Irritable, apprehensive some days; ERIC-7 = 15. Also describes problems falling and staying asleep, sad > 1/2 time. Decreased appetite and small weight loss. Concentration problems, guilt, decreased interest, anergia, slowing, restlessness. qids = 16. PsychHx: Psychotherapy at [facility] student mental health in past. Past suicidal fantasies. No suicidal behaviors. Wouldn't want to cause problems for people around her. No AVH. No delusions. No nate. No other problems. Sometimes depressed. Family hx: older brother & gm have bipolar disorder. GM was admitted to psych hospital before finally acknowledged problem. She suspects younger brother " "has anxiety disorder like herself. SocialHx: grew up with both parents. Always been shy, "scared of everything". "instead of encouraging". anxiety bad - ended up staying in my room. Withdrew from school - 1.5 to 2 years ago. Babysitting, doing things for parents. Attempts to get a job every now & then. Spends time with friends. Lives with parents. From [place] Lived at [facility] for 1 semester. Stopped going to classes and had to withdraw. "could have stayed. Parents haven't pushed her to fix the problem. "they'd rather push me out". "They're in denial". 3.25 GPA in HS "because I sometimes didn't do my homework". 32 on ACT (tied for 2nd highest in her school). Aspiration to open a restaurant. 2 brothers. Chronically nervous. Has a boyfriend. Met around time of [employer]. Good relationship. He's supportive and encouraging. He goes to college in [place]. Could function in high school. Not sure she'd be motivated to do online program. Doesn't follow-through with attempts to [place] try to babysit. MedHx: lactose intolerance, whey allergy. "lots of stomach problems".     Review Of Systems:     GENERAL:  No weight gain or loss  SKIN:  No rashes or lacerations  HEAD:  No headaches  CHEST:  No shortness of breath, hyperventilation or cough  CARDIOVASCULAR:  No tachycardia or chest pain  ABDOMEN:  No nausea, vomiting, pain, constipation or diarrhea  URINARY:  No frequency, dysuria or sexual dysfunction  ENDOCRINE:  No polydipsia, polyuria  MUSCULOSKELETAL:  No pain or stiffness of the joints  NEUROLOGIC:  No weakness, sensory changes, seizures, confusion, memory loss, tremor or other abnormal movements    Current Evaluation:     Nutritional Screening: Considering the patient's height and weight, medications, medical history and preferences, should a referral be made to the dietitian? no    Constitutional  Vitals:  Most recent vital signs, dated less than 90 days prior to this appointment, were not reviewed.    There were no vitals filed for " "this visit.     General:  unremarkable, age appropriate     Musculoskeletal  Muscle Strength/Tone:  no tremor, no tic   Gait & Station:  non-ataxic     Psychiatric  Appearance: casually dressed & groomed;   Behavior: calm,   Cooperation: cooperative with assessment  Speech: normal rate, volume, tone  Thought Process: linear, goal-directed  Thought Content: No suicidal or homicidal ideation; no delusions  Affect: anxious  Mood: "better"  Perceptions: No auditory or visual hallucinations  Level of Consciousness: alert throughout interview  Insight: fair  Cognition: Oriented to person, place, time, & situation  Memory: no apparent deficits to general clinical interview; not formally assessed  Attention/Concentration: no apparent deficits to general clinical interview; not formally assessed  Fund of Knowledge: average by vocabulary/education    Laboratory Data  No visits with results within 1 Month(s) from this visit.   Latest known visit with results is:   Lab Visit on 04/02/2018   Component Date Value Ref Range Status    Sodium 04/02/2018 141  136 - 145 mmol/L Final    Potassium 04/02/2018 3.8  3.5 - 5.1 mmol/L Final    Chloride 04/02/2018 107  95 - 110 mmol/L Final    CO2 04/02/2018 25  23 - 29 mmol/L Final    Glucose 04/02/2018 85  70 - 110 mg/dL Final    BUN, Bld 04/02/2018 17  6 - 20 mg/dL Final    Creatinine 04/02/2018 0.7  0.5 - 1.4 mg/dL Final    Calcium 04/02/2018 9.5  8.7 - 10.5 mg/dL Final    Total Protein 04/02/2018 7.5  6.0 - 8.4 g/dL Final    Albumin 04/02/2018 4.5  3.5 - 5.2 g/dL Final    Total Bilirubin 04/02/2018 0.3  0.1 - 1.0 mg/dL Final    Alkaline Phosphatase 04/02/2018 49* 55 - 135 U/L Final    AST 04/02/2018 20  10 - 40 U/L Final    ALT 04/02/2018 13  10 - 44 U/L Final    Anion Gap 04/02/2018 9  8 - 16 mmol/L Final    eGFR if African American 04/02/2018 >60.0  >60 mL/min/1.73 m^2 Final    eGFR if non African American 04/02/2018 >60.0  >60 mL/min/1.73 m^2 Final    hCG Quant " 04/02/2018 <1.2  See Text mIU/mL Final    Hepatitis B Surface Ag 04/02/2018 Negative   Final    Hep B C IgM 04/02/2018 Negative   Final    Hep A IgM 04/02/2018 Negative   Final    Hepatitis C Ab 04/02/2018 Negative   Final    RPR 04/02/2018 Non-reactive  Non-reactive Final    HIV 1/2 Ag/Ab 04/02/2018 Negative  Negative Final    H Pylori IgG Interp 04/02/2018 Negative  Negative Final     Medications  Outpatient Encounter Medications as of 9/14/2018   Medication Sig Dispense Refill    busPIRone (BUSPAR) 10 MG tablet Take 2 tablets (20 mg total) by mouth 2 (two) times daily. 120 tablet 2    omeprazole (PRILOSEC) 40 MG capsule Take 1 capsule (40 mg total) by mouth once daily. 30 capsule 2    ondansetron (ZOFRAN-ODT) 8 MG TbDL Take 1 tablet (8 mg total) by mouth 3 (three) times daily as needed. 20 tablet 0    polyethylene glycol (GLYCOLAX) 17 gram/dose powder Take 17 g by mouth once daily.       No facility-administered encounter medications on file as of 9/14/2018.      Assessment - Diagnosis - Goals:     Impression: 19 y/o F with generalized anxiety disorder, ongoing distress and impairment. Describes ongoing symptoms, further clinical improvement with regard to distress and dysfunction. Adherent to medication and tolerating well.     Dx: generalized anxiety disorder    Treatment Goals:  Specify outcomes written in observable, behavioral terms: decrease subjective anxiety reports, functioning    Treatment Plan/Recommendations:   · Buspirone to 20 mg bid. Discussed risks, benefits, and alternatives to treatment plan documented above with patient. I answered all patient questions related to this plan and patient expressed understanding and agreement.   · Psychotherapy referral.     Return to Clinic: 3 months    Counseling time: 5 minutes  Total time: 20 minutes    ABUNDIO Pierre MD

## 2018-09-14 ENCOUNTER — OFFICE VISIT (OUTPATIENT)
Dept: PSYCHIATRY | Facility: CLINIC | Age: 18
End: 2018-09-14
Payer: COMMERCIAL

## 2018-09-14 DIAGNOSIS — F41.1 GAD (GENERALIZED ANXIETY DISORDER): Primary | ICD-10-CM

## 2018-09-14 DIAGNOSIS — F40.9 PHOBIA, UNSPECIFIED TYPE: ICD-10-CM

## 2018-09-14 DIAGNOSIS — Z63.9 FAMILY TENSION: ICD-10-CM

## 2018-09-14 PROCEDURE — 90834 PSYTX W PT 45 MINUTES: CPT | Mod: S$GLB,,, | Performed by: SOCIAL WORKER

## 2018-09-14 PROCEDURE — 99999 PR PBB SHADOW E&M-EST. PATIENT-LVL I: CPT | Mod: PBBFAC,,, | Performed by: PSYCHIATRY & NEUROLOGY

## 2018-09-14 PROCEDURE — 99213 OFFICE O/P EST LOW 20 MIN: CPT | Mod: S$GLB,,, | Performed by: PSYCHIATRY & NEUROLOGY

## 2018-09-14 RX ORDER — BUSPIRONE HYDROCHLORIDE 10 MG/1
20 TABLET ORAL 2 TIMES DAILY
Qty: 120 TABLET | Refills: 2 | Status: SHIPPED | OUTPATIENT
Start: 2018-09-14 | End: 2018-12-10 | Stop reason: SDUPTHER

## 2018-09-14 SDOH — SOCIAL DETERMINANTS OF HEALTH (SDOH): PROBLEM RELATED TO PRIMARY SUPPORT GROUP, UNSPECIFIED: Z63.9

## 2018-09-14 NOTE — PROGRESS NOTES
"Individual Psychotherapy (PhD/LCSW)    8/31/2018    [facility]         Therapeutic Intervention: Met with patient.  Outpatient - Insight oriented psychotherapy 45 min - CPT code 36007 and Outpatient - Supportive psychotherapy 45 min - CPT Code 05988    Chief complaint/reason for encounter: anxiety and interpersonal     Interval history and content of current session:   (Late entry for 8/31/18)  18 year old female patient returned for follow up psychotherapy to address pervasive anxiety, family relationship stress and management of life stressors.  Previous session 7/30/18.  She presented alert and oriented, casually dressed, appropriately groomed.  She reported having just secured a job with [employer].  She is quite happy about it.  Planning to move in with a friend soon, as she settles into her job.  Said her parents "are not thrilled" by the move.  They count on her to do the laundry and dishes and other chores.  She talked about feeling eager to move out and stop feeling quite as controlled and treated like a child by her parents.  Mentioned a friend who is to be working with her, as well.  Patient endorsed feeling a bit more confident in herself.  Patient denied any si/hi, psychosis, cognitive deficit, mood swings, or substance abuse.  Supportive therapy provided.  Scheduled follow up on 9/14/2018.     Treatment plan:  · Target symptoms: anxiety , adjustment, family relationship tensions  · Why chosen therapy is appropriate versus another modality: relevant to diagnosis, patient responds to this modality  · Outcome monitoring methods: self-report, observation  · Therapeutic intervention type: insight oriented psychotherapy, supportive psychotherapy    Risk parameters:  Patient reports no suicidal ideation  Patient reports no homicidal ideation  Patient reports no self-injurious behavior  Patient reports no violent behavior    Verbal deficits: None    Patient's response to intervention:  The " patient's response to intervention is accepting.    Progress toward goals and other mental status changes:  The patient's progress toward goals is fair .    Diagnosis:     ICD-10-CM ICD-9-CM   1. ERIC (generalized anxiety disorder) F41.1 300.02   2. Family tension Z63.9 V61.9       Plan:  individual psychotherapy    Return to clinic: as scheduled    Length of Service (minutes): 45

## 2018-09-28 ENCOUNTER — OFFICE VISIT (OUTPATIENT)
Dept: PSYCHIATRY | Facility: CLINIC | Age: 18
End: 2018-09-28
Payer: COMMERCIAL

## 2018-09-28 DIAGNOSIS — Z63.9 FAMILY TENSION: ICD-10-CM

## 2018-09-28 DIAGNOSIS — F41.1 GAD (GENERALIZED ANXIETY DISORDER): Primary | ICD-10-CM

## 2018-09-28 PROCEDURE — 90834 PSYTX W PT 45 MINUTES: CPT | Mod: S$GLB,,, | Performed by: SOCIAL WORKER

## 2018-09-28 SDOH — SOCIAL DETERMINANTS OF HEALTH (SDOH): PROBLEM RELATED TO PRIMARY SUPPORT GROUP, UNSPECIFIED: Z63.9

## 2018-09-28 NOTE — PROGRESS NOTES
"Individual Psychotherapy (PhD/LCSW)    9/14/2018    [facility]         Therapeutic Intervention: Met with patient.  Outpatient - Insight oriented psychotherapy 45 min - CPT code 33239 and Outpatient - Supportive psychotherapy 45 min - CPT Code 75899    Chief complaint/reason for encounter: anxiety and interpersonal     Interval history and content of current session:   18 year old female patient returned for follow up psychotherapy to address pervasive anxiety, family relationship stress and management of life stressors.  Previous session 8/31/18.  She presented alert and oriented, casually dressed, appropriately groomed.  Sporting a new pink and copper blend hair color, which she talked about first thing, saying she had gone to a  for bright pink hair and was instead given a faded looking "messy" peach something.  So she sought out advice and went and added her own dawood rinse to bring out the copper tones and is still trying to decide what she thinks of it.  Smiling, appearing relaxed but still endorsing some anxiety; nevertheless, she reported feeling optimistic about her near-term future, having already moved out of her parents' home and into a house with a friend and 3 other people splitting rent, plus a boyfriend of one of those who, patient said, appears to always be there.  Patient has been hired at [employer], as was her friend, and both are still just starting on the work schedule, so neither has yet earned a paycheck.  Patient remains with goal set of saving enough money for her move to be with her boyfriend in [place] by December or January.  Happy to be out from under her parents' roof, though she said they reacted quite negatively and discouragingly toward her when she told them her plan.  Described continuing overall feeling more confident and positive toward herself.   Patient denied any si/hi, psychosis, cognitive deficit, mood swings, or substance abuse.  Supportive therapy " provided.  Scheduled follow up on 9/28/2018.     Treatment plan:  · Target symptoms: anxiety , adjustment, family relationship tensions  · Why chosen therapy is appropriate versus another modality: relevant to diagnosis, patient responds to this modality  · Outcome monitoring methods: self-report, observation  · Therapeutic intervention type: insight oriented psychotherapy, supportive psychotherapy    Risk parameters:  Patient reports no suicidal ideation  Patient reports no homicidal ideation  Patient reports no self-injurious behavior  Patient reports no violent behavior    Verbal deficits: None    Patient's response to intervention:  The patient's response to intervention is accepting.    Progress toward goals and other mental status changes:  The patient's progress toward goals is fair .    Diagnosis:     ICD-10-CM ICD-9-CM   1. ERIC (generalized anxiety disorder) F41.1 300.02   2. Family tension Z63.9 V61.9       Plan:  individual psychotherapy    Return to clinic: as scheduled    Length of Service (minutes): 45

## 2018-10-12 NOTE — PROGRESS NOTES
"Individual Psychotherapy (PhD/LCSW)    9/28/2018    [facility]         Therapeutic Intervention: Met with patient.  Outpatient - Insight oriented psychotherapy 45 min - CPT code 10207 and Outpatient - Supportive psychotherapy 45 min - CPT Code 04270    Chief complaint/reason for encounter: anxiety and interpersonal     Interval history and content of current session:   18 year old female patient returned for follow up psychotherapy to address pervasive anxiety, family relationship stress and management of life stressors.  Previous session 9/14/18.  Patient reported she had started her job at [employer] as of 9/18/18.  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  She said everyone has, however, been quite nice, all except for the , who has creasted problems with poor scheduling and then getting angry at the staff for the results of that poor scheduling.  She said she can relate to the  very well, that she has been very supportive and "greate."  Patient processing early work experiences; talked about the challenge of juggling a single car between the patient and her friend/roommate.  Interaction with her family of origin has been limited, for which she is thankful.  Very much looking forward to relocating to [place] to be with her boyfriend, hopefully by January.   Patient denied any si/hi, psychosis, cognitive deficit, mood swings, or substance abuse.  Supportive therapy provided.  Scheduled follow up on 11/8/2018.     Treatment plan:  · Target symptoms: anxiety , adjustment, family relationship tensions  · Why chosen therapy is appropriate versus another modality: relevant to diagnosis, patient responds to this modality  · Outcome monitoring methods: self-report, observation  · Therapeutic intervention " type: insight oriented psychotherapy, supportive psychotherapy    Risk parameters:  Patient reports no suicidal ideation  Patient reports no homicidal ideation  Patient reports no self-injurious behavior  Patient reports no violent behavior    Verbal deficits: None    Patient's response to intervention:  The patient's response to intervention is accepting.    Progress toward goals and other mental status changes:  The patient's progress toward goals is fair .    Diagnosis:     ICD-10-CM ICD-9-CM   1. ERIC (generalized anxiety disorder) F41.1 300.02   2. Family tension Z63.9 V61.9       Plan:  individual psychotherapy    Return to clinic: as scheduled    Length of Service (minutes): 45

## 2018-11-08 ENCOUNTER — OFFICE VISIT (OUTPATIENT)
Dept: PSYCHIATRY | Facility: CLINIC | Age: 18
End: 2018-11-08
Payer: COMMERCIAL

## 2018-11-08 DIAGNOSIS — F41.1 GAD (GENERALIZED ANXIETY DISORDER): Primary | ICD-10-CM

## 2018-11-08 DIAGNOSIS — Z63.9 FAMILY TENSION: ICD-10-CM

## 2018-11-08 PROCEDURE — 90834 PSYTX W PT 45 MINUTES: CPT | Mod: S$GLB,,, | Performed by: SOCIAL WORKER

## 2018-11-08 SDOH — SOCIAL DETERMINANTS OF HEALTH (SDOH): PROBLEM RELATED TO PRIMARY SUPPORT GROUP, UNSPECIFIED: Z63.9

## 2018-11-08 NOTE — PROGRESS NOTES
"Individual Psychotherapy (PhD/LCSW)    11/8/2018    [facility]         Therapeutic Intervention: Met with patient.  Outpatient - Insight oriented psychotherapy 45 min - CPT code 88908 and Outpatient - Supportive psychotherapy 45 min - CPT Code 64634    Chief complaint/reason for encounter: anxiety and interpersonal     Interval history and content of current session:   18 year old female patient returned for follow up psychotherapy to address pervasive anxiety, family relationship stress and management of life stressors.  Previous session 9/28/18.  Patient reported things are going well with her new job; she is managing expenses adequately since moving out of her parents' home.  Said her parents call regularly to tell her they miss her; they have made it clear they would rather have her at home, but she she lived under their roof, the rules and restrictions were stifling.  She indicated she is enjoying "starting to feel like an actual adult."  Talked extensively about her work experiences, described as tiring but encouraging.  She said she is starting to experience actually handling situations that she would not have known in the the past that she could handle.  Endorses naturally being quite shy and of wanting everyone to like her. She talked the difficult , happily reporting that he was fired weeks ago.  She relates well to the acting manager.  Mentioned a particular woman coworker who is quite experienced and does her job well, but who is domineering and intimidating to the patient, and she thinks this woman is rude and orders coworkers around, even though she is not any sort of .  The patient tries to avoid her; she said this woman will target people to undermine them if she does not like something about them.  The patient also talked about some difficult and demanding customers.  She endorsed feeling good about her own ability to not crumble under pressure from difficult " people.  Plans still in place to relocate to [place] to be with her boyfriend, sometime in January if possible.  Patient denied any si/hi, psychosis, cognitive deficit, mood swings, or substance abuse.  Supportive therapy provided.  Scheduled follow up on 11/15/2018.     Treatment plan:  · Target symptoms: anxiety , adjustment, family relationship tensions  · Why chosen therapy is appropriate versus another modality: relevant to diagnosis, patient responds to this modality  · Outcome monitoring methods: self-report, observation  · Therapeutic intervention type: insight oriented psychotherapy, supportive psychotherapy    Risk parameters:  Patient reports no suicidal ideation  Patient reports no homicidal ideation  Patient reports no self-injurious behavior  Patient reports no violent behavior    Verbal deficits: None    Patient's response to intervention:  The patient's response to intervention is accepting.    Progress toward goals and other mental status changes:  The patient's progress toward goals is fair .    Diagnosis:     ICD-10-CM ICD-9-CM   1. ERIC (generalized anxiety disorder) F41.1 300.02   2. Family tension Z63.9 V61.9       Plan:  individual psychotherapy    Return to clinic: as scheduled    Length of Service (minutes): 45

## 2018-11-26 ENCOUNTER — OFFICE VISIT (OUTPATIENT)
Dept: PSYCHIATRY | Facility: CLINIC | Age: 18
End: 2018-11-26
Payer: COMMERCIAL

## 2018-11-26 DIAGNOSIS — Z63.9 FAMILY DYNAMICS PROBLEM: ICD-10-CM

## 2018-11-26 DIAGNOSIS — F41.1 GAD (GENERALIZED ANXIETY DISORDER): Primary | ICD-10-CM

## 2018-11-26 PROCEDURE — 90834 PSYTX W PT 45 MINUTES: CPT | Mod: S$GLB,,, | Performed by: SOCIAL WORKER

## 2018-11-26 SDOH — SOCIAL DETERMINANTS OF HEALTH (SDOH): PROBLEM RELATED TO PRIMARY SUPPORT GROUP, UNSPECIFIED: Z63.9

## 2018-11-27 NOTE — PROGRESS NOTES
Individual Psychotherapy (PhD/LCSW)    11/26/2018    Site:  [place]         Therapeutic Intervention: Met with patient.  Outpatient - Insight oriented psychotherapy 45 min - CPT code 22416 and Outpatient - Supportive psychotherapy 45 min - CPT Code 64153    Chief complaint/reason for encounter: anxiety and interpersonal     Interval history and content of current session:   18 year old female patient returned for follow up psychotherapy to address pervasive anxiety, family relationship stress and management of life stressors.  Previous session 11/8/18.  She presented to session alert and oriented, casually dressed. She reported on the past week at her new [employer] job being hectic and very busy; working long holiday hours.  She described feeling a need to vent and unable to do so about customers.  She reported she has not had any days off in the past week.  Expressed some frustation at the number of customers wo seems to expect to be pampered and catered to, regardless of there being long lines and big customer crowds.  She reported some continued awkwardness with her parents, whom she described as severe empty-nesters.  Said they showed up at her place of work on Thanksgiving evening, unannounced.  She said they still try to campaign for her to return home, a place she felt stifled.  Her older brother, she said, has moved back in, jobless, but maintaining his apartment, which his parents somehow agreed to keep paying the rent.  She said her brother seems to take advantage of their desire to help, while she feels guilty and awkward to accept.  Patient maintains her plan to relocate to [place] to be with her boyfriend this January.  Patient denied any si/hi, psychosis, cognitive deficit, mood swings, or substance abuse.  Supportive therapy provided.  Scheduled follow up on 1/7/18 but is seeking an earlier appointment if one opens up.      Treatment plan:  · Target symptoms: anxiety , adjustment, family  relationship tensions  · Why chosen therapy is appropriate versus another modality: relevant to diagnosis, patient responds to this modality  · Outcome monitoring methods: self-report, observation  · Therapeutic intervention type: insight oriented psychotherapy, supportive psychotherapy    Risk parameters:  Patient reports no suicidal ideation  Patient reports no homicidal ideation  Patient reports no self-injurious behavior  Patient reports no violent behavior    Verbal deficits: None    Patient's response to intervention:  The patient's response to intervention is accepting.    Progress toward goals and other mental status changes:  The patient's progress toward goals is fair .    Diagnosis:     ICD-10-CM ICD-9-CM   1. ERIC (generalized anxiety disorder) F41.1 300.02   2. Family dynamics problem Z63.9 V61.9       Plan:  individual psychotherapy    Return to clinic: as scheduled    Length of Service (minutes): 45

## 2018-12-10 ENCOUNTER — PATIENT MESSAGE (OUTPATIENT)
Dept: PSYCHIATRY | Facility: CLINIC | Age: 18
End: 2018-12-10

## 2018-12-10 RX ORDER — BUSPIRONE HYDROCHLORIDE 10 MG/1
20 TABLET ORAL 2 TIMES DAILY
Qty: 360 TABLET | Refills: 0 | Status: SHIPPED | OUTPATIENT
Start: 2018-12-10 | End: 2019-03-10